# Patient Record
Sex: MALE | Race: WHITE | Employment: OTHER | URBAN - METROPOLITAN AREA
[De-identification: names, ages, dates, MRNs, and addresses within clinical notes are randomized per-mention and may not be internally consistent; named-entity substitution may affect disease eponyms.]

---

## 2017-01-24 PROCEDURE — 81003 URINALYSIS AUTO W/O SCOPE: CPT | Performed by: FAMILY MEDICINE

## 2017-02-24 PROBLEM — Z95.1 S/P CABG (CORONARY ARTERY BYPASS GRAFT): Status: ACTIVE | Noted: 2017-02-24

## 2018-01-08 PROCEDURE — 81003 URINALYSIS AUTO W/O SCOPE: CPT | Performed by: FAMILY MEDICINE

## 2018-01-18 PROBLEM — H90.6 MIXED CONDUCTIVE AND SENSORINEURAL HEARING LOSS OF BOTH EARS: Status: ACTIVE | Noted: 2018-01-18

## 2018-02-22 PROBLEM — R68.84 JAW PAIN: Status: ACTIVE | Noted: 2018-02-22

## 2018-03-26 PROBLEM — M51.26 HNP (HERNIATED NUCLEUS PULPOSUS), LUMBAR: Status: ACTIVE | Noted: 2018-03-26

## 2018-03-26 PROBLEM — M54.16 RIGHT LUMBAR RADICULITIS: Status: ACTIVE | Noted: 2018-03-26

## 2018-04-16 PROBLEM — Z98.890 HISTORY OF LUMBAR LAMINECTOMY FOR SPINAL CORD DECOMPRESSION: Status: ACTIVE | Noted: 2018-04-16

## 2018-05-01 PROBLEM — M54.41 CHRONIC RIGHT-SIDED LOW BACK PAIN WITH RIGHT-SIDED SCIATICA: Status: ACTIVE | Noted: 2018-05-01

## 2018-05-01 PROBLEM — G89.29 CHRONIC RIGHT-SIDED LOW BACK PAIN WITH RIGHT-SIDED SCIATICA: Status: ACTIVE | Noted: 2018-05-01

## 2019-02-04 PROBLEM — Z00.00 MEDICARE ANNUAL WELLNESS VISIT, SUBSEQUENT: Status: ACTIVE | Noted: 2019-02-04

## 2019-02-06 PROCEDURE — 81001 URINALYSIS AUTO W/SCOPE: CPT | Performed by: FAMILY MEDICINE

## 2019-08-08 PROCEDURE — 87086 URINE CULTURE/COLONY COUNT: CPT | Performed by: FAMILY MEDICINE

## 2019-08-08 PROCEDURE — 81003 URINALYSIS AUTO W/O SCOPE: CPT | Performed by: FAMILY MEDICINE

## 2020-01-30 PROBLEM — Z00.00 MEDICARE ANNUAL WELLNESS VISIT, SUBSEQUENT: Status: RESOLVED | Noted: 2019-02-04 | Resolved: 2020-01-30

## 2020-01-30 PROBLEM — IMO0001 MILD AORTIC SCLEROSIS: Status: ACTIVE | Noted: 2020-01-30

## 2020-02-04 PROBLEM — M25.511 CHRONIC RIGHT SHOULDER PAIN: Status: ACTIVE | Noted: 2020-02-04

## 2020-02-04 PROBLEM — G47.9 SLEEP DISTURBANCE: Status: ACTIVE | Noted: 2020-02-04

## 2020-02-04 PROBLEM — C44.519 BASAL CELL CARCINOMA (BCC) OF SKIN OF OTHER PART OF TORSO: Status: ACTIVE | Noted: 2020-02-04

## 2020-02-04 PROBLEM — G89.29 CHRONIC RIGHT SHOULDER PAIN: Status: ACTIVE | Noted: 2020-02-04

## 2021-01-18 PROBLEM — R68.84 JAW PAIN: Status: RESOLVED | Noted: 2018-02-22 | Resolved: 2021-01-18

## 2021-01-18 PROBLEM — M54.16 RIGHT LUMBAR RADICULITIS: Status: RESOLVED | Noted: 2018-03-26 | Resolved: 2021-01-18

## 2021-01-18 PROBLEM — E66.3 OVERWEIGHT (BMI 25.0-29.9): Status: ACTIVE | Noted: 2021-01-18

## 2021-01-18 PROBLEM — M51.26 HNP (HERNIATED NUCLEUS PULPOSUS), LUMBAR: Status: RESOLVED | Noted: 2018-03-26 | Resolved: 2021-01-18

## 2021-01-19 PROBLEM — R31.21 ASYMPTOMATIC MICROSCOPIC HEMATURIA: Status: ACTIVE | Noted: 2021-01-19

## 2021-02-22 PROBLEM — D49.4 BLADDER TUMOR: Status: ACTIVE | Noted: 2021-02-22

## 2021-03-05 PROCEDURE — 88307 TISSUE EXAM BY PATHOLOGIST: CPT | Performed by: UROLOGY

## 2021-03-09 PROBLEM — C67.8 MALIGNANT NEOPLASM OF OVERLAPPING SITES OF BLADDER (HCC): Status: ACTIVE | Noted: 2021-03-09

## 2021-03-09 PROBLEM — D49.4 BLADDER TUMOR: Status: RESOLVED | Noted: 2021-02-22 | Resolved: 2021-03-09

## 2021-06-04 PROCEDURE — 88108 CYTOPATH CONCENTRATE TECH: CPT | Performed by: UROLOGY

## 2021-11-02 PROBLEM — M62.551 ATROPHY OF MUSCLE OF RIGHT THIGH: Status: ACTIVE | Noted: 2021-11-02

## 2022-12-14 ENCOUNTER — HOSPITAL ENCOUNTER (OUTPATIENT)
Dept: PHYSICAL THERAPY | Facility: HOSPITAL | Age: 82
Discharge: HOME | End: 2022-12-14
Attending: ORTHOPAEDIC SURGERY
Payer: MEDICARE

## 2022-12-14 DIAGNOSIS — M54.16 LUMBAR RADICULOPATHY: ICD-10-CM

## 2023-11-17 PROCEDURE — 88342 IMHCHEM/IMCYTCHM 1ST ANTB: CPT | Performed by: PATHOLOGY

## 2023-11-17 PROCEDURE — 88341 IMHCHEM/IMCYTCHM EA ADD ANTB: CPT | Performed by: PATHOLOGY

## 2023-11-21 ENCOUNTER — LAB REQUISITION (OUTPATIENT)
Age: 83
End: 2023-11-21
Payer: MEDICARE

## 2023-11-21 DIAGNOSIS — D48.9 NEOPLASM OF UNCERTAIN BEHAVIOR: ICD-10-CM

## 2024-01-22 RX ORDER — GABAPENTIN 300 MG/1
300 CAPSULE ORAL 3 TIMES DAILY
COMMUNITY

## 2024-02-05 ENCOUNTER — ANESTHESIA EVENT (OUTPATIENT)
Dept: SURGERY | Facility: HOSPITAL | Age: 84
End: 2024-02-05
Payer: MEDICARE

## 2024-02-05 ENCOUNTER — HOSPITAL ENCOUNTER (INPATIENT)
Facility: HOSPITAL | Age: 84
LOS: 2 days | Discharge: HOME | End: 2024-02-07
Attending: ORTHOPAEDIC SURGERY | Admitting: ORTHOPAEDIC SURGERY
Payer: MEDICARE

## 2024-02-05 ENCOUNTER — APPOINTMENT (OUTPATIENT)
Dept: GENERAL RADIOLOGY | Facility: HOSPITAL | Age: 84
End: 2024-02-05
Attending: ORTHOPAEDIC SURGERY
Payer: MEDICARE

## 2024-02-05 ENCOUNTER — ANESTHESIA (OUTPATIENT)
Dept: SURGERY | Facility: HOSPITAL | Age: 84
End: 2024-02-05
Payer: MEDICARE

## 2024-02-05 PROCEDURE — 88300 SURGICAL PATH GROSS: CPT | Performed by: ORTHOPAEDIC SURGERY

## 2024-02-05 PROCEDURE — 0SN00ZZ RELEASE LUMBAR VERTEBRAL JOINT, OPEN APPROACH: ICD-10-PCS | Performed by: ORTHOPAEDIC SURGERY

## 2024-02-05 PROCEDURE — 0SG10AJ FUSION OF 2 OR MORE LUMBAR VERTEBRAL JOINTS WITH INTERBODY FUSION DEVICE, POSTERIOR APPROACH, ANTERIOR COLUMN, OPEN APPROACH: ICD-10-PCS | Performed by: ORTHOPAEDIC SURGERY

## 2024-02-05 PROCEDURE — 4A11X4G MONITORING OF PERIPHERAL NERVOUS ELECTRICAL ACTIVITY, INTRAOPERATIVE, EXTERNAL APPROACH: ICD-10-PCS | Performed by: ORTHOPAEDIC SURGERY

## 2024-02-05 PROCEDURE — 76000 FLUOROSCOPY <1 HR PHYS/QHP: CPT | Performed by: ORTHOPAEDIC SURGERY

## 2024-02-05 PROCEDURE — 0SG1071 FUSION OF 2 OR MORE LUMBAR VERTEBRAL JOINTS WITH AUTOLOGOUS TISSUE SUBSTITUTE, POSTERIOR APPROACH, POSTERIOR COLUMN, OPEN APPROACH: ICD-10-PCS | Performed by: ORTHOPAEDIC SURGERY

## 2024-02-05 DEVICE — IMPLANTABLE DEVICE: Type: IMPLANTABLE DEVICE | Site: BACK | Status: FUNCTIONAL

## 2024-02-05 DEVICE — OSTEOCEL PRO LARGE BULK BUY: Type: IMPLANTABLE DEVICE | Site: BACK | Status: FUNCTIONAL

## 2024-02-05 DEVICE — SCREW SPNL DIA5.5MM OPN TULIP LOK RELINE: Type: IMPLANTABLE DEVICE | Site: BACK | Status: FUNCTIONAL

## 2024-02-05 DEVICE — SCREW SPNL L50MM DIA6.5MM POST 16186550: Type: IMPLANTABLE DEVICE | Site: BACK | Status: FUNCTIONAL

## 2024-02-05 DEVICE — RELINE MAS RED SCREW 7.5X50 2C: Type: IMPLANTABLE DEVICE | Site: BACK | Status: FUNCTIONAL

## 2024-02-05 DEVICE — EXTENSION SPNL REDUC TULIP RELINE-O MOD MAS: Type: IMPLANTABLE DEVICE | Site: BACK | Status: FUNCTIONAL

## 2024-02-05 DEVICE — K WIRE FIX NIT BVL BLNT TIP PRECEPT: Type: IMPLANTABLE DEVICE | Site: BACK

## 2024-02-05 DEVICE — SCREW SPNL L50MM OD6.5MM 2C REDUC RELINE: Type: IMPLANTABLE DEVICE | Site: BACK | Status: FUNCTIONAL

## 2024-02-05 DEVICE — RELINE MAS TI ROD 5.5X95 LRDTC: Type: IMPLANTABLE DEVICE | Site: BACK | Status: FUNCTIONAL

## 2024-02-05 RX ORDER — DEXAMETHASONE SODIUM PHOSPHATE 4 MG/ML
VIAL (ML) INJECTION AS NEEDED
Status: DISCONTINUED | OUTPATIENT
Start: 2024-02-05 | End: 2024-02-05 | Stop reason: SURG

## 2024-02-05 RX ORDER — ONDANSETRON 2 MG/ML
4 INJECTION INTRAMUSCULAR; INTRAVENOUS EVERY 6 HOURS PRN
Status: DISCONTINUED | OUTPATIENT
Start: 2024-02-05 | End: 2024-02-05 | Stop reason: HOSPADM

## 2024-02-05 RX ORDER — GABAPENTIN 300 MG/1
300 CAPSULE ORAL 3 TIMES DAILY
Status: DISCONTINUED | OUTPATIENT
Start: 2024-02-05 | End: 2024-02-07

## 2024-02-05 RX ORDER — SODIUM CHLORIDE, SODIUM LACTATE, POTASSIUM CHLORIDE, CALCIUM CHLORIDE 600; 310; 30; 20 MG/100ML; MG/100ML; MG/100ML; MG/100ML
INJECTION, SOLUTION INTRAVENOUS CONTINUOUS
Status: DISCONTINUED | OUTPATIENT
Start: 2024-02-05 | End: 2024-02-05

## 2024-02-05 RX ORDER — TRANEXAMIC ACID 10 MG/ML
INJECTION, SOLUTION INTRAVENOUS AS NEEDED
Status: DISCONTINUED | OUTPATIENT
Start: 2024-02-05 | End: 2024-02-05 | Stop reason: SURG

## 2024-02-05 RX ORDER — ONDANSETRON 2 MG/ML
4 INJECTION INTRAMUSCULAR; INTRAVENOUS EVERY 6 HOURS PRN
Status: DISCONTINUED | OUTPATIENT
Start: 2024-02-05 | End: 2024-02-07

## 2024-02-05 RX ORDER — VANCOMYCIN HYDROCHLORIDE 1 G/20ML
INJECTION, POWDER, LYOPHILIZED, FOR SOLUTION INTRAVENOUS AS NEEDED
Status: DISCONTINUED | OUTPATIENT
Start: 2024-02-05 | End: 2024-02-05 | Stop reason: HOSPADM

## 2024-02-05 RX ORDER — HYDROCODONE BITARTRATE AND ACETAMINOPHEN 5; 325 MG/1; MG/1
1 TABLET ORAL ONCE AS NEEDED
Status: DISCONTINUED | OUTPATIENT
Start: 2024-02-05 | End: 2024-02-05 | Stop reason: HOSPADM

## 2024-02-05 RX ORDER — OXYCODONE HYDROCHLORIDE 5 MG/1
5 TABLET ORAL EVERY 4 HOURS PRN
Status: DISCONTINUED | OUTPATIENT
Start: 2024-02-05 | End: 2024-02-06

## 2024-02-05 RX ORDER — ONDANSETRON 2 MG/ML
INJECTION INTRAMUSCULAR; INTRAVENOUS AS NEEDED
Status: DISCONTINUED | OUTPATIENT
Start: 2024-02-05 | End: 2024-02-05 | Stop reason: SURG

## 2024-02-05 RX ORDER — EPHEDRINE SULFATE 50 MG/ML
INJECTION INTRAVENOUS AS NEEDED
Status: DISCONTINUED | OUTPATIENT
Start: 2024-02-05 | End: 2024-02-05 | Stop reason: SURG

## 2024-02-05 RX ORDER — BISACODYL 10 MG
10 SUPPOSITORY, RECTAL RECTAL
Status: DISCONTINUED | OUTPATIENT
Start: 2024-02-05 | End: 2024-02-07

## 2024-02-05 RX ORDER — ROCURONIUM BROMIDE 10 MG/ML
INJECTION, SOLUTION INTRAVENOUS AS NEEDED
Status: DISCONTINUED | OUTPATIENT
Start: 2024-02-05 | End: 2024-02-05 | Stop reason: SURG

## 2024-02-05 RX ORDER — HYDROCODONE BITARTRATE AND ACETAMINOPHEN 5; 325 MG/1; MG/1
2 TABLET ORAL ONCE AS NEEDED
Status: DISCONTINUED | OUTPATIENT
Start: 2024-02-05 | End: 2024-02-05 | Stop reason: HOSPADM

## 2024-02-05 RX ORDER — ATORVASTATIN CALCIUM 40 MG/1
80 TABLET, FILM COATED ORAL NIGHTLY
Status: DISCONTINUED | OUTPATIENT
Start: 2024-02-05 | End: 2024-02-07

## 2024-02-05 RX ORDER — HYDROMORPHONE HYDROCHLORIDE 1 MG/ML
0.4 INJECTION, SOLUTION INTRAMUSCULAR; INTRAVENOUS; SUBCUTANEOUS EVERY 2 HOUR PRN
Status: DISCONTINUED | OUTPATIENT
Start: 2024-02-05 | End: 2024-02-07

## 2024-02-05 RX ORDER — HYDROMORPHONE HYDROCHLORIDE 1 MG/ML
0.6 INJECTION, SOLUTION INTRAMUSCULAR; INTRAVENOUS; SUBCUTANEOUS EVERY 5 MIN PRN
Status: DISCONTINUED | OUTPATIENT
Start: 2024-02-05 | End: 2024-02-05 | Stop reason: HOSPADM

## 2024-02-05 RX ORDER — ACETAMINOPHEN 500 MG
1000 TABLET ORAL ONCE AS NEEDED
Status: DISCONTINUED | OUTPATIENT
Start: 2024-02-05 | End: 2024-02-05 | Stop reason: HOSPADM

## 2024-02-05 RX ORDER — HYDROMORPHONE HYDROCHLORIDE 1 MG/ML
0.2 INJECTION, SOLUTION INTRAMUSCULAR; INTRAVENOUS; SUBCUTANEOUS EVERY 2 HOUR PRN
Status: DISCONTINUED | OUTPATIENT
Start: 2024-02-05 | End: 2024-02-07

## 2024-02-05 RX ORDER — METOCLOPRAMIDE HYDROCHLORIDE 5 MG/ML
10 INJECTION INTRAMUSCULAR; INTRAVENOUS EVERY 8 HOURS PRN
Status: DISCONTINUED | OUTPATIENT
Start: 2024-02-05 | End: 2024-02-07

## 2024-02-05 RX ORDER — PHENYLEPHRINE HCL 10 MG/ML
VIAL (ML) INJECTION AS NEEDED
Status: DISCONTINUED | OUTPATIENT
Start: 2024-02-05 | End: 2024-02-05 | Stop reason: SURG

## 2024-02-05 RX ORDER — HYDROCODONE BITARTRATE AND ACETAMINOPHEN 10; 325 MG/1; MG/1
1 TABLET ORAL EVERY 4 HOURS PRN
COMMUNITY
Start: 2024-01-30

## 2024-02-05 RX ORDER — METHOCARBAMOL 750 MG/1
750 TABLET, FILM COATED ORAL EVERY 6 HOURS PRN
Status: DISCONTINUED | OUTPATIENT
Start: 2024-02-05 | End: 2024-02-07

## 2024-02-05 RX ORDER — DIPHENHYDRAMINE HCL 25 MG
25 CAPSULE ORAL EVERY 4 HOURS PRN
Status: DISCONTINUED | OUTPATIENT
Start: 2024-02-05 | End: 2024-02-07

## 2024-02-05 RX ORDER — CEFAZOLIN SODIUM/WATER 2 G/20 ML
2 SYRINGE (ML) INTRAVENOUS EVERY 8 HOURS
Qty: 40 ML | Refills: 0 | Status: COMPLETED | OUTPATIENT
Start: 2024-02-05 | End: 2024-02-06

## 2024-02-05 RX ORDER — CELECOXIB 200 MG/1
200 CAPSULE ORAL ONCE
Status: COMPLETED | OUTPATIENT
Start: 2024-02-05 | End: 2024-02-05

## 2024-02-05 RX ORDER — NALOXONE HYDROCHLORIDE 0.4 MG/ML
0.08 INJECTION, SOLUTION INTRAMUSCULAR; INTRAVENOUS; SUBCUTANEOUS AS NEEDED
Status: DISCONTINUED | OUTPATIENT
Start: 2024-02-05 | End: 2024-02-05 | Stop reason: HOSPADM

## 2024-02-05 RX ORDER — METOCLOPRAMIDE HYDROCHLORIDE 5 MG/ML
10 INJECTION INTRAMUSCULAR; INTRAVENOUS EVERY 8 HOURS PRN
Status: DISCONTINUED | OUTPATIENT
Start: 2024-02-05 | End: 2024-02-05 | Stop reason: HOSPADM

## 2024-02-05 RX ORDER — HYDROMORPHONE HYDROCHLORIDE 1 MG/ML
0.4 INJECTION, SOLUTION INTRAMUSCULAR; INTRAVENOUS; SUBCUTANEOUS EVERY 5 MIN PRN
Status: DISCONTINUED | OUTPATIENT
Start: 2024-02-05 | End: 2024-02-05 | Stop reason: HOSPADM

## 2024-02-05 RX ORDER — OXYCODONE HYDROCHLORIDE 5 MG/1
2.5 TABLET ORAL EVERY 4 HOURS PRN
Status: DISCONTINUED | OUTPATIENT
Start: 2024-02-05 | End: 2024-02-06

## 2024-02-05 RX ORDER — SENNOSIDES 8.6 MG
17.2 TABLET ORAL NIGHTLY
Status: DISCONTINUED | OUTPATIENT
Start: 2024-02-05 | End: 2024-02-07

## 2024-02-05 RX ORDER — METHOCARBAMOL 100 MG/ML
INJECTION, SOLUTION INTRAMUSCULAR; INTRAVENOUS
Status: COMPLETED
Start: 2024-02-05 | End: 2024-02-05

## 2024-02-05 RX ORDER — ACETAMINOPHEN 500 MG
1000 TABLET ORAL ONCE
Status: DISCONTINUED | OUTPATIENT
Start: 2024-02-05 | End: 2024-02-05 | Stop reason: HOSPADM

## 2024-02-05 RX ORDER — POLYETHYLENE GLYCOL 3350 17 G/17G
17 POWDER, FOR SOLUTION ORAL DAILY PRN
Status: DISCONTINUED | OUTPATIENT
Start: 2024-02-05 | End: 2024-02-07

## 2024-02-05 RX ORDER — DEXAMETHASONE SODIUM PHOSPHATE 4 MG/ML
10 VIAL (ML) INJECTION EVERY 8 HOURS
Qty: 15 ML | Refills: 0 | Status: COMPLETED | OUTPATIENT
Start: 2024-02-05 | End: 2024-02-06

## 2024-02-05 RX ORDER — DIPHENHYDRAMINE HYDROCHLORIDE 50 MG/ML
25 INJECTION INTRAMUSCULAR; INTRAVENOUS EVERY 4 HOURS PRN
Status: DISCONTINUED | OUTPATIENT
Start: 2024-02-05 | End: 2024-02-07

## 2024-02-05 RX ORDER — DOCUSATE SODIUM 100 MG/1
100 CAPSULE, LIQUID FILLED ORAL 2 TIMES DAILY
Status: DISCONTINUED | OUTPATIENT
Start: 2024-02-05 | End: 2024-02-07

## 2024-02-05 RX ORDER — ONDANSETRON 2 MG/ML
4 INJECTION INTRAMUSCULAR; INTRAVENOUS ONCE
Status: COMPLETED | OUTPATIENT
Start: 2024-02-05 | End: 2024-02-05

## 2024-02-05 RX ORDER — LIDOCAINE HYDROCHLORIDE 10 MG/ML
INJECTION, SOLUTION EPIDURAL; INFILTRATION; INTRACAUDAL; PERINEURAL AS NEEDED
Status: DISCONTINUED | OUTPATIENT
Start: 2024-02-05 | End: 2024-02-05 | Stop reason: SURG

## 2024-02-05 RX ORDER — SODIUM CHLORIDE 9 MG/ML
INJECTION, SOLUTION INTRAVENOUS CONTINUOUS PRN
Status: DISCONTINUED | OUTPATIENT
Start: 2024-02-05 | End: 2024-02-05 | Stop reason: SURG

## 2024-02-05 RX ORDER — METHOCARBAMOL 100 MG/ML
1 INJECTION, SOLUTION INTRAMUSCULAR; INTRAVENOUS ONCE
Status: COMPLETED | OUTPATIENT
Start: 2024-02-05 | End: 2024-02-05

## 2024-02-05 RX ORDER — HYDROMORPHONE HYDROCHLORIDE 1 MG/ML
0.2 INJECTION, SOLUTION INTRAMUSCULAR; INTRAVENOUS; SUBCUTANEOUS EVERY 5 MIN PRN
Status: DISCONTINUED | OUTPATIENT
Start: 2024-02-05 | End: 2024-02-05 | Stop reason: HOSPADM

## 2024-02-05 RX ORDER — DIAZEPAM 5 MG/1
5 TABLET ORAL EVERY 6 HOURS PRN
Status: DISCONTINUED | OUTPATIENT
Start: 2024-02-05 | End: 2024-02-07

## 2024-02-05 RX ORDER — METHOCARBAMOL 750 MG/1
1 TABLET, FILM COATED ORAL EVERY 8 HOURS PRN
COMMUNITY
Start: 2024-01-30

## 2024-02-05 RX ORDER — SODIUM CHLORIDE, SODIUM LACTATE, POTASSIUM CHLORIDE, CALCIUM CHLORIDE 600; 310; 30; 20 MG/100ML; MG/100ML; MG/100ML; MG/100ML
INJECTION, SOLUTION INTRAVENOUS CONTINUOUS
Status: DISCONTINUED | OUTPATIENT
Start: 2024-02-05 | End: 2024-02-05 | Stop reason: HOSPADM

## 2024-02-05 RX ORDER — CEFAZOLIN SODIUM/WATER 2 G/20 ML
2 SYRINGE (ML) INTRAVENOUS ONCE
Status: COMPLETED | OUTPATIENT
Start: 2024-02-05 | End: 2024-02-05

## 2024-02-05 RX ORDER — ENEMA 19; 7 G/133ML; G/133ML
1 ENEMA RECTAL ONCE AS NEEDED
Status: DISCONTINUED | OUTPATIENT
Start: 2024-02-05 | End: 2024-02-07

## 2024-02-05 RX ADMIN — ONDANSETRON 4 MG: 2 INJECTION INTRAMUSCULAR; INTRAVENOUS at 12:07:00

## 2024-02-05 RX ADMIN — PHENYLEPHRINE HCL 100 MCG: 10 MG/ML VIAL (ML) INJECTION at 08:26:00

## 2024-02-05 RX ADMIN — LIDOCAINE HYDROCHLORIDE 50 MG: 10 INJECTION, SOLUTION EPIDURAL; INFILTRATION; INTRACAUDAL; PERINEURAL at 08:11:00

## 2024-02-05 RX ADMIN — PHENYLEPHRINE HCL 100 MCG: 10 MG/ML VIAL (ML) INJECTION at 08:47:00

## 2024-02-05 RX ADMIN — EPHEDRINE SULFATE 10 MG: 50 INJECTION INTRAVENOUS at 09:49:00

## 2024-02-05 RX ADMIN — EPHEDRINE SULFATE 10 MG: 50 INJECTION INTRAVENOUS at 09:02:00

## 2024-02-05 RX ADMIN — CEFAZOLIN SODIUM/WATER 2 G: 2 G/20 ML SYRINGE (ML) INTRAVENOUS at 12:07:00

## 2024-02-05 RX ADMIN — CEFAZOLIN SODIUM/WATER 2 G: 2 G/20 ML SYRINGE (ML) INTRAVENOUS at 08:16:00

## 2024-02-05 RX ADMIN — PHENYLEPHRINE HCL 100 MCG: 10 MG/ML VIAL (ML) INJECTION at 08:44:00

## 2024-02-05 RX ADMIN — PHENYLEPHRINE HCL 100 MCG: 10 MG/ML VIAL (ML) INJECTION at 08:57:00

## 2024-02-05 RX ADMIN — ROCURONIUM BROMIDE 10 MG: 10 INJECTION, SOLUTION INTRAVENOUS at 08:11:00

## 2024-02-05 RX ADMIN — PHENYLEPHRINE HCL 100 MCG: 10 MG/ML VIAL (ML) INJECTION at 09:15:00

## 2024-02-05 RX ADMIN — PHENYLEPHRINE HCL 100 MCG: 10 MG/ML VIAL (ML) INJECTION at 08:50:00

## 2024-02-05 RX ADMIN — SODIUM CHLORIDE: 9 INJECTION, SOLUTION INTRAVENOUS at 11:41:00

## 2024-02-05 RX ADMIN — SODIUM CHLORIDE: 9 INJECTION, SOLUTION INTRAVENOUS at 08:16:00

## 2024-02-05 RX ADMIN — EPHEDRINE SULFATE 10 MG: 50 INJECTION INTRAVENOUS at 08:59:00

## 2024-02-05 RX ADMIN — PHENYLEPHRINE HCL 100 MCG: 10 MG/ML VIAL (ML) INJECTION at 08:54:00

## 2024-02-05 RX ADMIN — SODIUM CHLORIDE, SODIUM LACTATE, POTASSIUM CHLORIDE, CALCIUM CHLORIDE: 600; 310; 30; 20 INJECTION, SOLUTION INTRAVENOUS at 11:07:00

## 2024-02-05 RX ADMIN — SODIUM CHLORIDE, SODIUM LACTATE, POTASSIUM CHLORIDE, CALCIUM CHLORIDE: 600; 310; 30; 20 INJECTION, SOLUTION INTRAVENOUS at 12:57:00

## 2024-02-05 RX ADMIN — TRANEXAMIC ACID 1000 MG: 10 INJECTION, SOLUTION INTRAVENOUS at 08:45:00

## 2024-02-05 RX ADMIN — DEXAMETHASONE SODIUM PHOSPHATE 8 MG: 4 MG/ML VIAL (ML) INJECTION at 08:51:00

## 2024-02-05 NOTE — CONSULTS
[facility] Hospitalist Consult Note     Emlie Hidalgo Patient Status:  Inpatient    1940 MRN [id number]   Location [facility] 3SW-A Attending ERIC Ordonez MD   Hosp Day # 0 PCP Zack Grey MD     Consult: postoperative medical comanagement    Consulting Provider: ERIC Ordonez MD    History of Present Illness: Emile Hidalgo is a 83 year old male with CAD, HTN/HL, lumbar spinal stenosis who presents following L1-L2, L2-L3, L3-4, L4-5 revision decompression with instrumented fusion earlier today.  Procedure was complicated by incidental durotomy.  No EBL was recorded.    Following up to the floor, he remained afebrile and hemodynamically stable.  He reports minimal pain, no headache, chest pain/shortness of breath/nausea or lower extremity numbness/weakness.    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    Other and unspecified hyperlipidemia     Plantar fascial fibromatosis 2013    Resolved last addressed  18    Pure hypercholesterolemia 2015    Right lumbar radiculitis 2018    Resolved last addressed  19    Unspecified essential hypertension     Visual impairment     glasses for reading    Wears hearing aid     bilateral        Past Surgical History:   Past Surgical History:   Procedure Laterality Date    BACK SURGERY      BACK SURGERY      CABG      single vessel bypass    CABG, ARTERIAL, SINGLE  1984    CATH BARE METAL  STENT (BMS)  2002    COLON CA SCRN NOT HI RSK IND  6/30/08    Performed by YAHAIRA POTTS at [facility] [place]    CYSTOSCOPY,INSERT URETERAL STENT      OTHER SURGICAL HISTORY  02/15/2021    Cystoscopy-    OTHER SURGICAL HISTORY  03/05/2021    TURBT w/ Dr Meier    OTHER SURGICAL HISTORY  06/04/2021    BTS Cysto Dr. Padilla    OTHER SURGICAL HISTORY  12/10/2021    BTS cysto Dr. Padilla    SPINE SURGERY PROCEDURE UNLISTED  2010/2015        Social History:  reports that he quit smoking about 56 years ago. His smoking use included cigarettes. He has a 10 pack-year smoking history. He has never used smokeless tobacco. He reports that he does not currently use alcohol. He reports current drug use. Drug: Cannabis.    Family History:   Family History   Problem Relation Age of Onset    Cancer Mother     Heart Disorder Father        Allergies: No Known Allergies    Medications:    No current facility-administered medications on file prior to encounter.     Current Outpatient Medications on File Prior to Encounter   Medication Sig Dispense Refill    HYDROcodone-acetaminophen  MG Oral Tab Take 1 tablet by mouth every 4 (four) hours as needed for Pain.      methocarbamol 750 MG Oral Tab Take 1 tablet (750 mg total) by mouth every 8 (eight) hours as needed.      gabapentin 300 MG Oral Cap Take 1 capsule (300 mg total) by mouth 3 (three) times daily.      Omega-3 Fatty Acids (FISH OIL OR) Take 1 capsule by mouth daily.      PATIENT SUPPLIED MEDICATION Take by mouth once daily. CBD oil      CALCIUM OR Take 3,000 mg by mouth daily.      Ascorbic Acid (VITAMIN C OR) Take 1,000 mg by mouth daily.      VITAMIN D OR Take 1,000 mg by mouth daily.      LOSARTAN 100 MG Oral Tab TAKE 1 TABLET EVERY DAY 90 tablet 2    ATORVASTATIN 80 MG Oral Tab TAKE 1 TABLET EVERY NIGHT 90 tablet 3    aspirin 81 MG Oral Tab EC Take 1 tablet (81 mg total) by mouth daily.      naproxen 220 MG Oral Tab Take by mouth 2 (two) times a day.  Takes two tab daily in AM      Multiple Vitamins-Minerals (SENIOR MULTIVITAMIN PLUS) Oral Tab Take 1 tablet by mouth daily.         Review of Systems:   A comprehensive 14 point review of systems was completed.    Pertinent positives and negatives noted in the HPI.    Physical Exam:    /72 (BP Location: Right arm)   Pulse 90   Temp 98 °F (36.7 °C) (Oral)   Resp 16   Ht 5' 10\" (1.778 m)   Wt 191 lb (86.6 kg)   SpO2 95%   BMI 27.41 kg/m²       General: No acute distress. Comfortable, nontoxic   HEENT: Normocephalic atraumatic. Moist mucous membranes; Sclera anicteric.  Neck: Supple, no JVD  Respiratory: Clear to auscultation bilaterally. Reg resp rate & effort, no wheezes/crackles   Cardiovascular: S1, S2. Regular rate and rhythm. No murmurs appreciable   Abdomen: Soft, nontender, nondistended.  Positive bowel sounds. No rebound, guarding or organomegaly.  Neurologic: No focal neurological deficits. CNII-XII grossly intact.  Distal sensation intact to light touch  Musculoskeletal: Moves all extremities.  Extremities: No edema or cyanosis.  Wiggles toes, pedal pulses palpable  Skin/lines: Unable to assess surgical dressing due to bedrest; Houser intact draining clear yellow urine  Psychiatric: Appropriate mood and affect.      Diagnostic Data:      Labs:  No results for input(s): \"WBC\", \"HGB\", \"MCV\", \"PLT\", \"BAND\", \"INR\" in the last 168 hours.    Invalid input(s): \"LYM#\", \"MONO#\", \"BASOS#\", \"EOSIN#\"    No results for input(s): \"GLU\", \"BUN\", \"CREATSERUM\", \"GFRAA\", \"GFRNAA\", \"CA\", \"ALB\", \"NA\", \"K\", \"CL\", \"CO2\", \"ALKPHO\", \"AST\", \"ALT\", \"BILT\", \"TP\" in the last 168 hours.    CrCl cannot be calculated (Patient's most recent lab result is older than the maximum 7 days allowed.).    No results for input(s): \"PTP\", \"INR\" in the last 168 hours.    No results for input(s): \"TROP\", \"CK\" in the last 168 hours.    Imaging: Imaging data reviewed in Epic.      ASSESSMENT / PLAN:     Emile Hidalgo Is a a 83 year old  male who presents following L1-L2 decompression and uninstrumented fusion, revision L2-L5 TLIF with incidental durotomy    Problem List / Diagnoses    Lumbar spinal stenosis  S/p incidental durotomy  CAD  HTN/HL    Recommendations    Lumbar spinal stenosis  -S/p L1-L2 decompression and uninstrumented fusion, revision L2 L5 TLIF lumbar spinal stenosis  -Avoid AC/AP agents  - Pain controlled with current regimen, continue  - PT/OT pending once patient can be off bedrest    S/p incidental durotomy  -Patient currently asymptomatic  - Bedrest per spine surgery    CAD  HTN/HL  -Avoid aspirin given above  -Resume home statin  -Hold antihypertensives until POD #1, resume as BP allows    DVT Mechanical Prophylaxis: WYATT hose, SCDs,    DVT Pharmacologic Prophylaxis   Medication   None              Dispo: Stable on floor, will follow    Plan of care discussed with patient and/or family at bedside    KULWINDER Estrada MD  [facility]   [phone]

## 2024-02-05 NOTE — ANESTHESIA PREPROCEDURE EVALUATION
PRE-OP EVALUATION    Patient Name: Emile Hidalgo    Admit Diagnosis: S/P LUMBAR FUSION; LUMBAR STENOSIS    Pre-op Diagnosis: S/P LUMBAR FUSION; LUMBAR STENOSIS    LUMBAR 1 - LUMBAR 2, LUMBAR 2 - LUMBAR 3, LUMBAR 3 - LUMBAR 4, LUMBAR 4 - LUMBAR 5 REVISION DECOMPRESSION WITH INSTRUMENTED FUSION    Anesthesia Procedure: LUMBAR 1 - LUMBAR 2, LUMBAR 2 - LUMBAR 3, LUMBAR 3 - LUMBAR 4, LUMBAR 4 - LUMBAR 5 REVISION DECOMPRESSION WITH INSTRUMENTED FUSION (Spine Lumbar)  INTRAOPERATIVE NEURO MONITORING    Surgeon(s) and Role:     * ERIC Ordonez MD - Primary    Pre-op vitals reviewed.  Temp: 97.9 °F (36.6 °C)  Pulse: 69  Resp: 14  BP: 127/78  SpO2: 96 %  Body mass index is 27.41 kg/m².    Current medications reviewed.  Hospital Medications:   acetaminophen (Tylenol Extra Strength) tab 1,000 mg  1,000 mg Oral Once    lactated ringers infusion   Intravenous Continuous    [COMPLETED] ondansetron (Zofran) 4 MG/2ML injection 4 mg  4 mg Intravenous Once    [COMPLETED] celecoxib (CeleBREX) cap 200 mg  200 mg Oral Once    ceFAZolin (Ancef) 2 g in 20mL IV syringe premix  2 g Intravenous Once       Outpatient Medications:     Medications Prior to Admission   Medication Sig Dispense Refill Last Dose    HYDROcodone-acetaminophen  MG Oral Tab Take 1 tablet by mouth every 4 (four) hours as needed for Pain.   post op    methocarbamol 750 MG Oral Tab Take 1 tablet (750 mg total) by mouth every 8 (eight) hours as needed.   post op    gabapentin 300 MG Oral Cap Take 1 capsule (300 mg total) by mouth 3 (three) times daily.   2/4/2024 at 1900    Omega-3 Fatty Acids (FISH OIL OR) Take 1 capsule by mouth daily.   1/21/2024    PATIENT SUPPLIED MEDICATION Take by mouth once daily. CBD oil   1/21/2024    CALCIUM OR Take 3,000 mg by mouth daily.   2/4/2024 at 0800    Ascorbic Acid (VITAMIN C OR) Take 1,000 mg by mouth daily.   2/4/2024 at 0800    VITAMIN D OR Take 1,000 mg by mouth daily.   1/21/2024    LOSARTAN 100 MG Oral Tab TAKE 1  TABLET EVERY DAY 90 tablet 2 2/4/2024 at 1900    ATORVASTATIN 80 MG Oral Tab TAKE 1 TABLET EVERY NIGHT 90 tablet 3 2/4/2024 at 1900    aspirin 81 MG Oral Tab EC Take 1 tablet (81 mg total) by mouth daily.   1/21/2024    naproxen 220 MG Oral Tab Take by mouth 2 (two) times a day. Takes two tab daily in AM   1/21/2024    Multiple Vitamins-Minerals (SENIOR MULTIVITAMIN PLUS) Oral Tab Take 1 tablet by mouth daily.   1/21/2024       Allergies: Patient has no known allergies.      Anesthesia Evaluation    Patient summary reviewed.    Anesthetic Complications           GI/Hepatic/Renal    Negative GI/hepatic/renal ROS.                             Cardiovascular      ECG reviewed.            (+) hypertension     (+) CAD                                Endo/Other    Negative endo/other ROS.                              Pulmonary    Negative pulmonary ROS.                       Neuro/Psych                 (+) neuromuscular disease             Art line        Past Surgical History:   Procedure Laterality Date    BACK SURGERY  2010    BACK SURGERY      CABG  1983    single vessel bypass    CABG, ARTERIAL, SINGLE  1984    CATH BARE METAL STENT (BMS)  2002    COLON CA SCRN NOT HI RSK IND  6/30/08    Performed by YAHAIRA POTTS at [facility], [facility]    CYSTOSCOPY,INSERT URETERAL STENT      OTHER SURGICAL HISTORY  02/15/2021    Cystoscopy-    OTHER SURGICAL HISTORY  03/05/2021    TURBT w/ Dr Meier    OTHER SURGICAL HISTORY  06/04/2021    BTS Cysto Dr. Padilla    OTHER SURGICAL HISTORY  12/10/2021    BTS cysto Dr. Padilla    SPINE SURGERY PROCEDURE UNLISTED  2010/2015      Social History     Socioeconomic History    Marital status:     Number of children: 4   Occupational History    Occupation:      Comment: retired 2005   Tobacco Use    Smoking status: Former     Packs/day: 1.00     Years: 10.00     Additional pack years: 0.00     Total pack years: 10.00     Types: Cigarettes     Quit date:  1968     Years since quittin.1    Smokeless tobacco: Never   Vaping Use    Vaping Use: Never used   Substance and Sexual Activity    Alcohol use: Not Currently     Comment: 3 beers daily    Drug use: Yes     Types: Cannabis   Other Topics Concern     Service Yes     Comment: reserves    Blood Transfusions Yes     Comment: CABG    Caffeine Concern No    Occupational Exposure No    Hobby Hazards No    Sleep Concern No    Stress Concern No    Weight Concern No    Special Diet Yes     Comment: low fat    Back Care Yes    Exercise Yes    Seat Belt Yes     History   Drug Use    Types: Cannabis     Available pre-op labs reviewed.               Airway      Mallampati: II       Cardiovascular    Cardiovascular exam normal.         Dental    Dentition appears grossly intact         Pulmonary    Pulmonary exam normal.                 Other findings              ASA: 3   Plan: general  NPO status verified and           Plan/risks discussed with: patient                Present on Admission:  **None**

## 2024-02-05 NOTE — BRIEF OP NOTE
Pre-Operative Diagnosis: S/P LUMBAR FUSION; LUMBAR STENOSIS     Post-Operative Diagnosis: S/P LUMBAR FUSION; LUMBAR STENOSIS      Procedure Performed:   LUMBAR 1 - LUMBAR 2, LUMBAR 2 - LUMBAR 3, LUMBAR 3 - LUMBAR 4, LUMBAR 4 - LUMBAR 5 REVISION DECOMPRESSION WITH INSTRUMENTED FUSION with incidental durotomy    Surgeon(s) and Role:     * ERIC Ordonez MD - Primary    Assistant(s):  PA: Vinay Snow PA     Surgical Findings: above     Specimen: hardware for gross     Estimated Blood Loss: No data recorded    Dictation Number:      DEDE Marroquin  2/5/2024  12:45 PM

## 2024-02-05 NOTE — ANESTHESIA POSTPROCEDURE EVALUATION
[facility]    Emile Hidalgo Patient Status:  Inpatient   Age/Gender 83 year old male MRN [id number]   Location [facility] SURGERY Attending ERIC Ordonez MD   Hosp Day # 0 PCP Zack Grey MD       Anesthesia Post-op Note    LUMBAR 1 - LUMBAR 2, LUMBAR 2 - LUMBAR 3, LUMBAR 3 - LUMBAR 4, LUMBAR 4 - LUMBAR 5 REVISION DECOMPRESSION WITH INSTRUMENTED FUSION    Procedure Summary       Date: 02/05/24 Room / Location:  MAIN OR 11 / [facility] MAIN OR    Anesthesia Start: 0808 Anesthesia Stop: 1257    Procedures:       LUMBAR 1 - LUMBAR 2, LUMBAR 2 - LUMBAR 3, LUMBAR 3 - LUMBAR 4, LUMBAR 4 - LUMBAR 5 REVISION DECOMPRESSION WITH INSTRUMENTED FUSION (Spine Lumbar)      INTRAOPERATIVE NEURO MONITORING (Spine Lumbar) Diagnosis: (S/P LUMBAR FUSION; LUMBAR STENOSIS)    Surgeons: ERIC Ordonez MD Anesthesiologist: Reji Garcia MD    Anesthesia Type: general ASA Status: 3            Anesthesia Type: general    Vitals Value Taken Time   /87 02/05/24 1303   Temp 97.5 °F (36.4 °C) 02/05/24 1303   Pulse 103 02/05/24 1303   Resp 18 02/05/24 1303   SpO2 95 % 02/05/24 1303       Patient Location: PACU    Anesthesia Type: general    Airway Patency: patent    Postop Pain Control: adequate    Mental Status: preanesthetic baseline    Nausea/Vomiting: none    Cardiopulmonary/Hydration status: stable euvolemic    Complications: no apparent anesthesia related complications    Postop vital signs: stable    Dental Exam: Unchanged from Preop

## 2024-02-05 NOTE — ANESTHESIA PROCEDURE NOTES
Arterial Line    Date/Time: 2/5/2024 8:10 AM    Performed by: Hilaria May CRNA  Authorized by: Hilaria May CRNA    General Information and Staff    Procedure Start:  2/5/2024 8:10 AM  Procedure End:  2/5/2024 8:13 AM  Anesthesiologist:  Reji Garcia MD  CRNA:  Hilaria May CRNA  Performed By:  CRNA  Patient Location:  OR  Indication: continuous blood pressure monitoring and blood sampling needed    Site Identification: surface landmarks    Preanesthetic Checklist: 2 patient identifiers, IV checked, risks and benefits discussed, monitors and equipment checked, pre-op evaluation, timeout performed, anesthesia consent and sterile technique used    Procedure Details    Catheter Size:  20 G  Catheter Length:  1 and 3/4 inch  Catheter Type:  Arrow  Seldinger Technique?: Yes    Laterality:  Left  Site:  Radial artery  Site Prep: chlorhexidine    Line Secured:  Wrist Brace, tape and Tegaderm    Assessment    Events: patient tolerated procedure well with no complications      Medications  2/5/2024 8:10 AM      Additional Comments

## 2024-02-05 NOTE — PLAN OF CARE
Received pt from pacu at 1500.  Awake and alert.  Moves all extremities.  Denies n/t.  Dressing dry and intact to back.  Gel ice in place.  Houser intact and patent.  Flat bedrest maintained as per order.  Chairback brace at bedside.  Pt verbalized understanding of POC and fall precautions.

## 2024-02-05 NOTE — ANESTHESIA PROCEDURE NOTES
Airway  Date/Time: 2/5/2024 8:13 AM  Urgency: elective      General Information and Staff    Patient location during procedure: OR  Anesthesiologist: Reji Garcia MD  Resident/CRNA: Hilaria May CRNA  Performed: anesthesiologist   Performed by: Hilaria May CRNA  Authorized by: Reji Garcia MD      Indications and Patient Condition  Indications for airway management: anesthesia  Sedation level: deep  Preoxygenated: yes  Patient position: sniffing  Mask difficulty assessment: 1 - vent by mask    Final Airway Details  Final airway type: endotracheal airway      Successful airway: ETT  Cuffed: yes   Successful intubation technique: direct laryngoscopy  Endotracheal tube insertion site: oral  Blade: Padma  Blade size: #4  ETT size (mm): 7.5    Cormack-Lehane Classification: grade I - full view of glottis  Placement verified by: capnometry   Measured from: lips  ETT to lips (cm): 22  Number of attempts at approach: 1

## 2024-02-05 NOTE — PROGRESS NOTES
S: Moderate back pain with no leg pain.  No new numbness or weakness.  No headaches.  Laying flat in bed. No other complaints.     Inspection:  Awake alert No acute distress. No difficulty breathing     Blood pressure 101/69, pulse 95, temperature 97.5 °F (36.4 °C), temperature source Temporal, resp. rate 19, height 5' 10\" (1.778 m), weight 191 lb (86.6 kg), SpO2 97%.    No results for input(s): \"RBC\", \"HGB\", \"HCT\", \"MCV\", \"MCH\", \"MCHC\", \"RDW\", \"NEPRELIM\", \"WBC\", \"PLT\" in the last 168 hours.    Lumbar/Sacral Integument: Incision/ incisions;  Dressing in place, good wound approximation no erythema or fluctuance    Moving bilateral LE's.     Sensation: No sensory deficits noted on bilateral lower extremities    Calves supple, NT bilaterally    A/P: POD # 0 s/p L1-2 decompression and un instrumented fusion, and revision L2-5 TLIF with incidental durotomy    P: We will keep him flat overnight.  He is doing good.  No complaints.  F/u with him in the morning.    Vinay Snow PA-C

## 2024-02-05 NOTE — ANESTHESIA PROCEDURE NOTES
Peripheral IV  Date/Time: 2/5/2024 8:15 AM  Inserted by: Hilaria May CRNA    Placement  Needle size: 18 G  Laterality: left  Location: hand  Local anesthetic: none  Site prep: alcohol  Technique: anatomical landmarks  Attempts: 1

## 2024-02-05 NOTE — H&P
HPI:  Pre op H&P    84 y/o WM with long history of lower back pain and sciatica; recently having more pain and even numbness/weakness in the legs. No loss bowel or bladder control. Has been thru PT, medication, injections with no sustained benefit. Has had prior back surgery. Will have elective revision decompression of his previous fusion surgery.  No recent illness or fever.    Allergies:  No Known Allergies  Current Meds:  Current Outpatient Medications  Medication Sig Dispense Refill  HYDROcodone-acetaminophen (NORCO) 5-325 MG Oral Tab Take 1 tablet by mouth every 6 (six) hours as needed for Pain. 10 tablet 0  NON FORMULARY one time. CBD oil  Omega-3 Fatty Acids (FISH OIL OR) Take by mouth.  losartan 100 MG Oral Tab Take 1 tablet (100 mg total) by mouth daily. 90 tablet 3  atorvastatin 80 MG Oral Tab Take 1 tablet (80 mg total) by mouth nightly. 90 tablet 3  gabapentin 300 MG Oral Cap Take 1 capsule (300 mg total) by mouth 3 (three) times daily. 270 capsule 3  naproxen 220 MG Oral Tab Take by mouth 2 (two) times daily. (Patient not taking: Reported on 11/9/2023)  aspirin 81 MG Oral Tab EC Take 81 mg by mouth daily. (Patient not taking: Reported on 11/9/2023)  Multiple Vitamins-Minerals (SENIOR MULTIVITAMIN PLUS) Oral Tab Take by mouth.      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  Resolved last addressed 1/18/18  Pneumonia due to organism  Pure hypercholesterolemia 12/18/2015  Right lumbar radiculitis  2018  Resolved last addressed 19  Unspecified essential hypertension  Wears hearing aid  bilateral    Past Surgical History:  Procedure Laterality Date  BACK SURGERY 2010  BACK SURGERY  CABG 1983  single vessel bypass  CABG, ARTERIAL, SINGLE 1984  CATH BARE METAL STENT (BMS)   COLON CA SCRN NOT HI RSK IND 2008  Performed by YAHAIRA POTTS at [facility] [place], [facility]  CYSTOSCOPY,INSERT URETERAL STENT  OTHER SURGICAL HISTORY 02/15/2021  Cystoscopy-  OTHER SURGICAL HISTORY 2021  TURBT w/ Dr Meier  OTHER SURGICAL HISTORY 2021  BTS Cysto Dr. Padilla  OTHER SURGICAL HISTORY 12/10/2021  BTS cysto Dr. Padilla  OTHER SURGICAL HISTORY 2022  Cysto Dr. Padilla  OTHER SURGICAL HISTORY 2022  BTS cysto Dr. Padilla  OTHER SURGICAL HISTORY 2023  Cysto Dr. Padilla  OTHER SURGICAL HISTORY 2023  Cystoscopy   OTHER SURGICAL HISTORY 10/13/2023  Cystoscopy   SKIN SURGERY 2022  BCC R superior eyebrow MMS Dr. Meek  SPINE SURGERY PROCEDURE UNLISTED     Family History  Problem Relation Age of Onset  Cancer Mother  Heart Disorder Father    Family Status  Relation Status  Mo  at age 75  Son Alive  Son Alive  Son Alive  Son Alive  Fa   patient was adopted    Social History  Socioeconomic History  Marital status:   Number of children: 4  Highest education level: Master's degree (e.g., MA, MS, Penelope, MEd, MSW, MARIANA)  Occupational History  Occupation:   Comment: retired   Tobacco Use  Smoking status: Former  Packs/day: 1.00  Years: 10.00  Additional pack years: 0.00  Total pack years: 10.00  Types: Cigarettes  Quit date: 1968  Years since quittin.1  Smokeless tobacco: Never  Vaping Use  Vaping Use: Never used  Substance and Sexual Activity  Alcohol use: Yes  Alcohol/week: 20.0 standard drinks of alcohol  Types: 24 Cans of beer per week  Comment: 3 beers daily  Drug use: No  Other  Topics  Concerns:   Service: Yes  reserves  Blood Transfusions: Yes  CABG  Caffeine Concern: No  Occupational Exposure: No  Hobby Hazards: No  Sleep Concern: No  Stress Concern: No  Weight Concern: No  Special Diet: Yes  low fat  Back Care: Yes  Exercise: Yes  Seat Belt: Yes    ROS:  GENERAL HEALTH: otherwise feels well  SKIN: denies any unusual skin lesions or rashes  EYES: no visual complaints or deficits, has glasses/correction  HEENT: denies nasal congestion, sinus pain or sore throat; has hearing loss, no gum bleeding or caries, no dysphagia  RESPIRATORY: denies shortness of breath, wheezing or cough  CARDIOVASCULAR: denies chest pain or DOMINGUEZ; no palpitations  GI: denies nausea, vomiting, constipation, diarrhea; no rectal bleeding; no heartburn, no acid reflux  GENITAL/: no dysuria, urgency or frequency; no epididymal or testicular pain; no penile discharge; no hernias; occas nocturia  MUSCULOSKELETAL: has joint complaints upper or lower extremities; back as above; also R shoulder  NEURO: has sensory or motor complaint, no frequent headaches  PSYCHE: no symptoms of depression or anxiety  HEMATOLOGY: denies hx anemia; denies bruising or excessive bleeding  ENDOCRINE: denies excessive thirst or urination; denies unexpected wt gain or wt loss  ALLERGY/IMM.: denies food or seasonal allergies    PHYSICAL EXAM:  GENERAL: well developed, well nourished, in no apparent distress  SKIN: no rashes, no suspicious lesions  EYES: PERRLA, EOMI, sclera anicteric, conjunctiva normal; there is no nystagmus  HEENT: normocephalic; normal nose,  NECK: supple; FROM; no JVD, no carotid bruits, no thyroid masses or enlargement  RESPIRATORY: clear to percussion and auscultation  CARDIOVASCULAR: S1, S2 normal, RRR; no S3, no S4; no click; murmur negative, PMI not displaced  ABDOMEN: normal active BS+, soft, nondistended; no HSM; no masses; no bruits; nontender  LYMPHATIC: no lymphadenopathy  MUSCULOSKELETAL: no acute synovitis  upper or lower extremity  EXTREMITIES: no cyanosis, clubbing or edema, peripheral pulses 2 + and equal  NEUROLOGIC: intact; no sensorimotor deficit; reflexes 1+  PSYCHIATRIC: alert and oriented x 3; affect appropriate    ASSESSMENT/ PLAN:  Diagnoses and all orders for this visit:    Spinal stenosis of lumbar region with neurogenic claudication  - OFFICE/OUTPT VISIT,EST,LEVL IV  Chronic low back pain w/ sciatica and leg weakness  Will have elective spine surgery  Acceptable risk for anesthesia and elective spine surgery  Will forward pre op H&P to requesting surgeon via FAX and emr    ACC/AHA stage B heart failure (HCC)  - OFFICE/OUTPT VISIT,EST,LEVL IV  No edema; no rales; heart exam unremarkable; cont meds; monitor for exacerbation    Aorto-iliac atherosclerosis (HCC)  - OFFICE/OUTPT VISIT,EST,LEVL IV  Pulses 2+; no bruits; pt has no symptoms; continue to monitor; work on risk factor reduction    History of bladder cancer  - OFFICE/OUTPT VISIT,EST,LEVL IV  No current sx; getting ongoing surveillance; cont close urology f/u    Hypertensive heart disease with chronic right-sided congestive heart failure (HCC)  - OFFICE/OUTPT VISIT,EST,LEVL IV  BP well controlled; no signs of worsening heart failure; cont meds and monitor for exacerbation    Pure hypercholesterolemia  - OFFICE/OUTPT VISIT,EST,LEVL IV  Lipids are under adequate control. Medication is providing therapeutic benefit.  Comments: low cholesterol diet and regular exercise;  Labs: Lipid & CMP To be done in 6 months .  No change in current meds.  The above referenced H&P was reviewed by Milly Ordonez MD on 2/5/2024, and no significant changes have occurred in the patient's condition since the H&P was performed.      Risks and benefits discussed in detail.  Risks including but not limited to bleeding infection, spinal leaks, neurologic injury, paralysis and worsening of symptoms.  No guarantees made. If fusion recommended risks of pseaudarthosis, hardware  failure/migration were verbalized.

## 2024-02-06 LAB
HCT VFR BLD AUTO: 35.7 %
HGB BLD-MCNC: 11.6 G/DL

## 2024-02-06 PROCEDURE — 97165 OT EVAL LOW COMPLEX 30 MIN: CPT

## 2024-02-06 PROCEDURE — 97161 PT EVAL LOW COMPLEX 20 MIN: CPT

## 2024-02-06 PROCEDURE — 97535 SELF CARE MNGMENT TRAINING: CPT

## 2024-02-06 PROCEDURE — 85014 HEMATOCRIT: CPT | Performed by: PHYSICIAN ASSISTANT

## 2024-02-06 PROCEDURE — 85018 HEMOGLOBIN: CPT | Performed by: PHYSICIAN ASSISTANT

## 2024-02-06 RX ORDER — HYDROCODONE BITARTRATE AND ACETAMINOPHEN 10; 325 MG/1; MG/1
2 TABLET ORAL EVERY 6 HOURS PRN
Status: DISCONTINUED | OUTPATIENT
Start: 2024-02-06 | End: 2024-02-07

## 2024-02-06 RX ORDER — HYDROCODONE BITARTRATE AND ACETAMINOPHEN 10; 325 MG/1; MG/1
1 TABLET ORAL EVERY 4 HOURS PRN
Status: DISCONTINUED | OUTPATIENT
Start: 2024-02-06 | End: 2024-02-07

## 2024-02-06 NOTE — OPERATIVE REPORT
[facility]    PATIENT'S NAME: ZI LOVE   ATTENDING PHYSICIAN: ERIC Ordonez M.D.   OPERATING PHYSICIAN: ERIC Ordonez M.D.   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  PROCEDURE PERFORMED:    1.   L1-2 decompression, noninstrumented fusion.  2.   L3 to L5 revision decompression with instrumented fusion after exploration of fusion, with removal of hardware.  3.      L2-3, L3-4 interbody fusion  4. L4-5 posterior lateral fusion    INDICATIONS:  The patient had failed reasonable conservative measures which are outlined in the patient's clinic medical record.  Physical therapy, medical management, injections, alternative treatment are among those offered unless motor deficits are progressive. Indications for surgery are based on scientific literature and GIORGIO guidelines.  A thorough meeting with the patient and at least one key caregiver was had.  The risks and benefits were discussed in significant detail.  The risks include, but are not limited to, bleeding, infection, spinal leaks, nerve injuries, recurrent disc herniation, lumbar instability, and need for further surgery.  I have gone over the operation using models, diagrams, and the patient's own imaging studies. Additional written and digital sources were provided.  No guarantees were made.    ASSISTANT:  DEDE Martinez.  A skilled and experienced assistant was required for the entire surgical procedure.  As a lumbar spinal reconstruction, the procedure is done in immediate proximity to critical neural elements and is done in  close proximity to the critical vascular and visceral structures.  Much of the surgery is done in and around the cauda equine and its exiting nerves.  Any assistance, including, but not limited to, retraction, suction, and other means of facilitation of the procedure requires an individual with substantial postgraduate training and substantial spinal surgical experience.    ESTIMATED BLOOD LOSS:  150 mL.     DRAINS:  None.     DESCRIPTION OF PROCEDURE:  After obtaining informed consent, the patient was taken to the operating room.  SCDs and WYATT hose were applied.  Preoperative antibiotics were delivered.  The patient was placed prone on a Slim frame.  Neuromonitoring leads and baselines were achieved.  All bony prominences were padded.  Incision was marked and locally prepped.  A spinal needle was applied.  X-ray confirmed appropriate localization of our incision.  The needle was removed, and the back was sterilely prepped and draped.  A longitudinal incision was made with a #10 blade.  Bovie electrocautery was used for hemostasis. Dissection was taken down to the level of the fascia.  Subperiosteal dissection was taken at the level of the facet joints but keeping the facet capsules entirely intact.  Self-retaining retractors were applied.    We first began at the proximal level of our decompression and removed portions of the interspinous ligament and spinous processes.  This was done at all indicated levels.  Curettes were used to gain access to the canal.  Gonzalez ball was used to dissect ligamentum flavum away and free from the neural elements.  Then 4 and 5-mm Kerrisons were used to resect thickened ligamentum flavum and central canal stenosis of the lamina.    Attention then turned to the L1-2 level where the cranial and caudal nerve roots were both explored.  This was done by elevating a plane with the Gonzalez ball and curettes between the ligamentum flavum and the dura.  With the dura dissected free and  downward, we identified the plane and then removed this with 3, 4, and 5 mm Kerrisons.  Attention then turned to the foraminotomies, the cranial and caudal nerve roots at this level.  Contralateral decompression was undertaken thus creating a bilateral decompression and bilateral microforaminotomy and hemilaminotomy using undercutting technique.  An undercutting facet sparing hemilaminectomy was also completed. With undercutting technique and a Gonzalez ball in place, we excised ligamentum flavum through the bone at the cranial foramina as needed, facilitating a serial cranial decompression and undercutting microforaminotomy and hemilaminotomy versus undercutting hemilaminectomy.  This was done in an undercutting fashion as well and done to examine the central and contralateral neural elements facilitating central bilateral decompression as well. This was done using undercutting technique up to the level of the pedicle, with an extraforaminal area also being decompressed with the undercutting technique.  There was significant stenosis.  There was no obvious disc extrusion identified.  At the conclusion of the decompression, all areas were free of neurologic compression.      After assessing the complete decompression, we evaluated the facets.  An excess of 50% of the facets bilaterally were removed, thus increasing the likelihood of subsequent instability.  We then elected to proceed with the posterolateral fusion at the L1-2 levels.  Posterior elements were decorticated.  Local bone was applied.    Final x-ray was taken to confirm appropriate levels were addressed. The wound was thoroughly irrigated.  Hemostasis was maintained.  Floseal was applied to help with hemostasis and then was irrigated away. Valsalva maneuver confirmed that there was no spinal leak.  DuraGen was applied, subfascial drain was applied.  The fascia was closed in water-tight fashion with figure-of-eight 1-0 Vicryl; 2-0 Vicryl was used for the  subcutaneous tissues and running 3-0 subcuticular stitches were applied.  Steri-Strips were applied.  Sterile dressings were applied.     AP fluoroscopy was wheeled in.  We marked pedicles at all levels outlined above bilaterally.  Two separate incisions 1-1/2 fingerbreadths lateral to this approximately an inch and a half long were made with a larger incision on the patient's more symptomatic side.  This was done with a #10 blade. Bovie electrocautery was used for hemostasis.  Dissection was taken down to the level of the fascia.  Fascia was split in paraspinal fashion bilaterally.  We identified the hardware at L4-5.  This was sequentially removed after removing top tighteners and rods.  Through pathways were maintained with K-wires so as to reinsert hardware later.  Pseudoarthrosis was identified at that level after identifying clefts in the posterolateral fusion.  I-PAS needles were utilized with live neuromonitoring in place.  Baselines had been achieved.  We had confirmed 4 out of 4 twitches with our colleagues and neuromonitoring.  Under fluoroscopic guidance, we targeted the lateral aspect of the pedicles bilaterally.  These I-PAS needles were then impacted medially and then checked under lateral fluoroscopy for being in appropriate position.  Bone marrow was aspirated by placing a needle through the skin and through the periosteum and into the pedicle.  A 10 mL syringe was used to aspirate several milliliters of bone marrow aspirate.  The needle was removed with a twisting motion.  All I-PAS needles were found to be well above acceptable thresholds. Styluses were removed.  K-wires were applied and advanced.  Inner styluses were removed as well.  Both sides had dilators applied and had appropriate taps applied.  We placed a modular screw with hoop shims attached over the K-wire.  Both taps were applied with live neuromonitoring and found to have thresholds well above acceptable limits.  We repeated the same  steps at distal levels without difficulty.  The retractor apparatus was placed over the hoop shims.  A medial retractor was sized and applied.  All tissues were retracted out of harm's way with regards to muscle in the paraspinal plane and an articulating arm was attached to the retractor.  Bovie electrocautery was used to remove remaining tissue over the pars and facet.    Pre-operative measurements documenting a mismatch between pelvic incidence and lumbar lordosis was made, thus documenting clinically significant kyphosis. As a result, lumbar osteotomies are required to improve the patient's overall sagittal balance.    Our attention first turned to the L3-4 level.  The facet was osteotomized and resected.  A bur was used to remove additional bone.  Pars was resected as well.  We identified the lateral ligamentum flavum and resected that with a Gonzalez ball in place over the dura and a 4 and 5-mm Kerrison. Pars was resected as well.  The area was further osteotomized from cranial to caudal pedicle using bur, Kerrison rongeurs, and other instruments.  Neural elements were not harmed during this process.  The osteotomy facilitated reduction of the patient's kyphosis by allowing for compression via pedicle screws at the conclusion of the case.      Because the patient had prior surgery at this level, an extensive lysis of adhesions ventrally and dorsally was required to mobilize the nerve roots.  This required fine curets and Gonzalez used in sequential fashion with 2 and 3 mm Kerrison rongeurs.  This procedure required extensive time and expertise, lengthening the time of the operation.  This process was required to complete the necessary decompression of nerve roots at this level.    A bur was used to remove additional bone.  We identified the lateral ligamentum flavum and resected that with a Gonzalez ball in place over the dura and a 4 and 5-mm Kerrison.  Neural elements were identified and defined for a standard  posterior interbody fusion.    Significant spinal stenosis was identified consistent with the patient's radiculopathy and deficits.  We then were required to perform a technically demanding decompression using fine microcurrettes, Kerrison rongeurs, microinstruments, and magnification.  Despite adherence of the compressive pathology to neural elements, we were able to define tissue planes to help facilitate release of neurocompressive pathology.  The procedure required additional time and technique beyond that of the interbody fusion itself.   Contralateral decompression was undertaken, thus creating a bilateral decompression and bilateral microforaminotomy and hemilaminotomy using undercutting technique.  With undercutting technique and a Gonzalez ball in place, we excised ligamentum flavum through the bone at the cranial foramina as needed, facilitating a serial cranial decompression and undercutting  microforaminotomy and laminectomy.  This was done in an undercutting fashion as well and done to examine the central and contralateral neural elements facilitating central bilateral decompression as well.  The traversing nerve root was identified and thoroughly decompressed.  Undercutting decompression was undertaken.  An extraforaminal and transfacet/transpedicular decompression was utilized to perform a thorough decompression of the exiting nerve root and remove redundant annulus and any herniated nucleus pulposus.      Significant lateral compressive disease was identified consistent with the patient's radiculopathy and deficits.  We then were required to perform a technically demanding decompression using fine microcurrettes, Kerrison rongeurs, microinstruments, and magnification.  Despite adherence of the compressive pathology to neural elements, we were able to define tissue planes to help facilitate release of neurocompressive pathology.  The procedure required additional time and technique beyond that of the  interbody fusion itself.   Curettes, jaswinder, and pituitary rongeurs were used to remove the rest of the remaining disc in order to perform lateral extraforaminal and transpedicular discectomy.      Endplates were prepared.  Curettes and osteotomes were used to perform bony anterior release and osteotomize any anterior column ossification.  We applied some distraction across the pedicle screws.  We sized for a cage.  Allograft complex was impacted into the lateral disc space, and the cage was impacted as well with nerve roots safely out of harm's way.    Our attention first turned to the L2-3 level.  The facet was osteotomized and resected.  A bur was used to remove additional bone.  Pars was resected as well.  We identified the lateral ligamentum flavum and resected that with a Gonzalez ball in place over the dura and a 4 and 5-mm Kerrison. Pars was resected as well.  The area was further osteotomized from cranial to caudal pedicle using bur, Kerrison rongeurs, and other instruments.  Neural elements were not harmed    Because the patient had prior surgery at this level, an extensive lysis of adhesions ventrally and dorsally was required to mobilize the nerve roots.  This required fine curets and Gonzalez used in sequential fashion with 2 and 3 mm Kerrison rongeurs.  This procedure required extensive time and expertise, lengthening the time of the operation.  This process was required to complete the necessary decompression of nerve roots at this level.    Our attention then turned to utilization of the separate contralateral incision.  With a retractor in place, we localized the far lateral region of the level.  We examined areas more farther laterally to facilitate a transpedicular/transforaminal decompression.  Nerve root retractor was applied.  We were able to retract the traversing nerve root over.  Annulotomy was performed.  Farther lateral structures and paraspinal muscles were dissected away from the area of  the transverse process and farther anteriorly.  Psoas muscle tissues were also dissected free in the far lateral plane.      Significant lateral compressive disease was identified consistent with the patient's radiculopathy and deficits.  We then were required to perform a technically demanding decompression using fine microcurrettes, Kerrison rongeurs, microinstruments, and magnification.  Despite adherence of the compressive pathology to neural elements, we were able to define tissue planes to help facilitate release of neurocompressive pathology.  The procedure required additional time and technique beyond that of the interbody fusion itself.   Curettes, jaswinder, and pituitary rongeurs were used to remove the rest of the remaining disc in order to perform lateral extraforaminal and transpedicular discectomy.      Endplates were prepared.  Curettes and osteotomes were used to perform bony anterior release and osteotomize any anterior column ossification.  We applied some distraction across the pedicle screws.  We sized for a cage.  Allograft complex was impacted into the lateral disc space, and the cage was impacted as well with nerve roots safely out of harm's way.    Our attention then turned to the adjacent L4-5 level where we repeated the same steps.  The retractor apparatus was placed over the hoop shims.  A medial retractor was sized and applied.  All tissues were retracted out of harm's way with regards to muscle in the paraspinal plane, and an articulating arm was attached to the retractor.  Bovie electrocautery was used to remove remaining tissue over the pars and facet.  Pre-operative measurements documenting a mismatch between pelvic incidence and lumbar lordosis was made thus documenting clinically significant kyphosis.  The facet was osteotomized and resected.  A bur was used to remove additional bone.  Pars was resected as well.  We identified the lateral ligamentum flavum and resected that with a  Gonzalez ball in place over the dura and a 4 and 5 mm Kerrison.      Significant spinal stenosis was identified consistent with the patient's radiculopathy and deficits.  We then were required to perform a technically demanding decompression using fine microcurrettes, Kerrison rongeurs, microinstruments, and magnification.  Despite adherence of the compressive pathology to neural elements, we were able to define tissue planes to help facilitate release of neurocompressive pathology.  The procedure required additional time and technique beyond that of the interbody fusion itself.  The traversing nerve root was identified and thoroughly decompressed.  Contralateral decompression was undertaken, thus creating a bilateral decompression and bilateral microforaminotomy and hemilaminotomy using undercutting technique.  With undercutting technique and a Gonzalez ball in place, we excised ligamentum flavum through the bone at the cranial foramina as needed, facilitating a serial cranial decompression and undercutting microforaminotomy and hemilaminotomy versus undercutting hemilaminectomy.  This was done in an undercutting fashion and done to examine the central and contralateral neural elements facilitating central bilateral decompression as well.      Because of additional disease and neurocompressive pathology in the extraforaminal space, an extraforaminal and transpedicular decompression was utilized to perform a thorough decompression of the exiting nerve root and remove redundant annulus and any herniated nucleus pulposus.  Because the patient had prior surgery at this level, an extensive lysis of adhesions ventrally and dorsally was required to mobilize the nerve roots.  This required fine curets and Gonzalez used in sequential fashion with 2 and 3 mm Kerrison rongeurs.  This procedure required extensive time and expertise, lengthening the time of the operation.  This process was required to complete the necessary  decompression of nerve roots at this level.     Utilizing the incisions and fascial openings previously utilized for the osteotomy and decompression we proceeded with a posterolateral fusion at the L1-2 and L4-5 levels.  Posterior elements were decorticated.  Allograft and local bone graft were applied.  The wounds were thoroughly irrigated.    Hemostasis was obtained.  Tulip heads were placed on the contralateral side.  Then, with dilators in place, the screws were placed.  Instrumentation was placed on the right over K-wires in standard technique and fluoroscopy.    Screws were advanced under fluoroscopy.  All screws were tested using real-time neuromonitoring.  Thresholds were above acceptable values.  Rods and top tighteners were applied bilaterally, and final tightening was applied with compression devices to make use of the osteotomy, thereby reducing kyphosis and pelvic incidence and lordosis mismatch.  X-rays confirmed appropriate localization of our instrumentation.  Valsalva maneuver confirmed that there was no spinal leak.  The wound was thoroughly irrigated.  Hemostasis was maintained.  The fascia was reapproximated bilaterally using 1-0 Vicryl.  A 2-0 Vicryl was used for subcutaneous tissues.  A running 3-0 subcuticular stitch was applied.  Steri-Strips were applied.  Sterile dressings were applied.    The patient was extubated and taken to the recovery room in stable condition and was neurologically intact on arrival and had improvement of leg pain.  SSEPs remained at their baseline.  Needle and Ray-Fritz counts were correct at the conclusion of the case.    The patient was extubated and taken to the recovery room in stable condition and was neurologically intact at its baseline and stable on arrival.  Needle and Ray-Fritz counts were correct at the conclusion of the case.    Dictated By ERIC Ordonez M.D.  d: 02/06/2024 13:02:01  t: 02/06/2024 16:14:40  Job [id number]  PRP/

## 2024-02-06 NOTE — PROGRESS NOTES
[facility]    Progress Note     Emile Hidalgo Patient Status:  Inpatient    1940 MRN [id number]   Location [facility] 3SW-A Attending ERIC Ordonez MD   Hosp Day # 1 PCP Zack Grey MD     Follow up for: There were no encounter diagnoses.      Interval History/Subjective:     HOME overnight  Afebrile, HDS    Incisional pain controlled, denies HA, SOB, nausea or LE numbness. Awaiting Houser removal & PT.     Vital signs:  Temp:  [97.5 °F (36.4 °C)-98.4 °F (36.9 °C)] 98 °F (36.7 °C)  Pulse:  [] 85  Resp:  [16-25] 17  BP: ()/(61-87) 121/65  SpO2:  [92 %-99 %] 97 %    Physical Exam:    General: NAD, Comfortable, Nontoxic   Respiratory: CTAB; reg resp rate & effort, no wheezes/crackles  Cardiovascular: S1, S2. Regular rate and rhythm. No murmurs appreciated  Abdomen: Soft, NTND, no guarding/rebound   Neurologic: No focal neurological deficits.   Extremities: No edema.   Skin/Lines: Dry, no rashes, ulcers or lesions; Houser cathter draining clear yellow urine    Diagnostic Data:      Labs:  Recent Labs   Lab 24  0554   HGB 11.6*       No results for input(s): \"GLU\", \"BUN\", \"CREATSERUM\", \"GFRAA\", \"GFRNAA\", \"CA\", \"ALB\", \"NA\", \"K\", \"CL\", \"CO2\", \"ALKPHO\", \"AST\", \"ALT\", \"BILT\", \"TP\" in the last 168 hours.    No results for input(s): \"PTP\", \"INR\" in the last 168 hours.    No results for input(s): \"TROP\", \"CK\" in the last 168 hours.         Imaging: Imaging data reviewed in Epic.    Medications:    sennosides  17.2 mg Oral Nightly    docusate sodium  100 mg Oral BID    [COMPLETED] dexamethasone  10 mg Intravenous Q8H    atorvastatin  80 mg Oral Nightly    gabapentin  300 mg Oral TID       ASSESSMENT / PLAN:     Emile Hidalgo Is a a 83 year old male who presents following L1-L2 decompression and uninstrumented fusion, revision L2-L5 TLIF with incidental durotomy     Problem List / Diagnoses     Lumbar spinal stenosis  S/p incidental durotomy  CAD  HTN/HL     Recommendations      Lumbar spinal stenosis  -S/p L1-L2 decompression and uninstrumented fusion, revision L2 L5 TLIF lumbar spinal stenosis  -Avoid AC/AP agents  - Pain controlled with current regimen, continue  - PT/OT pending     S/p incidental durotomy  -Patient currently asymptomatic  - Bedrest/activity per spine surgery     CAD  HTN/HL  -Avoid aspirin given above  -Resume home statin  -BP currently acceptable, continue holding     DVT Mechanical Prophylaxis: WYATT hose, SCDs,    DVT Pharmacologic Prophylaxis   Medication   None                 Dispo: stable on floor; will follow; no contraindications to discharge from my perspective    Plan of care discussed with patient and/or family at bedside.    KULWINDER Estrada MD  [facility]   [phone]      This note was created using voice recognition technology. It may include inadvertent transcriptional errors. Any such errors should be contextually interpreted and should not be taken to alter the content or the meaning.     Note to Patient: The 21st Century Cures Act makes medical notes like these available to patients in the interest of transparency. However, be advised this is a medical document. It is intended as peer to peer communication. It is written in medical language and may contain abbreviations or verbiage that are unfamiliar. It may appear blunt or direct. Medical documents are intended to carry relevant information, facts as evident, and the clinical opinion of the practitioner and not intended to be the primary source of your information.  Please refer directly to myself or clinical staff for information regarding plan of care.

## 2024-02-06 NOTE — PHYSICAL THERAPY NOTE
PHYSICAL THERAPY EVALUATION - INPATIENT     Room Number: 385/385-A  Evaluation Date: 2/6/2024  Type of Evaluation: Initial  Physician Order: PT Eval and Treat    Presenting Problem: L1-L2 decompression and fusion, L2-L5 TLIF with incidental durotomy  Co-Morbidities : HTN, lumbar fusion, CABG, atrophy of R thigh muscle,  Reason for Therapy: Mobility Dysfunction and Discharge Planning  PROCEDURE 2/5/24 DR. LEO: L1-2 decompression and un instrumented fusion, and revision L2-5 TLIF with incidental durotomy    PHYSICAL THERAPY ASSESSMENT   Patient is a 83 year old male admitted 2/5/2024 for L1-L2 fusion, and L2-L5 TLIF.   Patient is currently functioning at baseline with bed mobility, transfers, gait, and stair negotiation.    Patient currently does not meet criteria for skilled inpatient physical therapy services, however patient will continue to benefit from QID ambulation with nursing staff.  Pt is hereby discharged from IP PT services.  RN aware to re-order if there is a decline in mobility status.      PLAN  Patient has been evaluated and presents with no skilled Physical Therapy needs at this time.  Patient discharged from Physical Therapy services.  Please re-order if a new functional limitation presents during this admission.    GOALS  Patient was able to achieve the following goals ...    Patient was able to transfer At previous, functional level  Safely and independently   Patient able to ambulate on level surfaces At previous, functional level  Safely and independently     HOME SITUATION  Type of Home: House   Home Layout: Multi-level  Stairs to Enter : 0     Stairs to Bedroom: 6  Railing: Yes    Lives With: Alone (supportive neighbors that plan to help)  Drives: Yes  Patient Owned Equipment: None  Patient Regularly Uses: Reading glasses;Hearing aides    Prior Level of [place]: Per pt independent with all aspects of mobility. Lives in bi-level home alone. Supportive neighbors able to assist. Does  not own any devices. Enjoys writing (Screen plays and books). Has prepared meals to last for 6 weeks and has done all the laundry.     SUBJECTIVE  \"I like to make shrimp gumbo!\"    OBJECTIVE  Precautions: Spine;Hard of hearing;Lumbar brace  Fall Risk: Standard fall risk    WEIGHT BEARING RESTRICTION  Weight Bearing Restriction: None                PAIN ASSESSMENT  Rating: Unable to rate  Location: incisional  Management Techniques: Activity promotion;Body mechanics;Repositioning    COGNITION  Overall Cognitive Status:  WFL - within functional limits    RANGE OF MOTION AND STRENGTH ASSESSMENT  Upper extremity ROM and strength are within functional limits   Lower extremity ROM is within functional limits   Lower extremity strength is within functional limits       BALANCE  Static Sitting: Good  Dynamic Sitting: Fair +  Static Standing: Fair  Dynamic Standing: Fair -    ADDITIONAL TESTS                                    ACTIVITY TOLERANCE                         O2 WALK       NEUROLOGICAL FINDINGS                        AM-PAC '6-Clicks' INPATIENT SHORT FORM - BASIC MOBILITY  How much difficulty does the patient currently have...  Patient Difficulty: Turning over in bed (including adjusting bedclothes, sheets and blankets)?: None   Patient Difficulty: Sitting down on and standing up from a chair with arms (e.g., wheelchair, bedside commode, etc.): None   Patient Difficulty: Moving from lying on back to sitting on the side of the bed?: None   How much help from another person does the patient currently need...   Help from Another: Moving to and from a bed to a chair (including a wheelchair)?: None   Help from Another: Need to walk in hospital room?: None   Help from Another: Climbing 3-5 steps with a railing?: A Little       AM-PAC Score:  Raw Score: 23   Approx Degree of Impairment: 11.2%   Standardized Score (AM-PAC Scale): 56.93   CMS Modifier (G-Code): CI    FUNCTIONAL ABILITY STATUS  Gait Assessment   Functional  Mobility/Gait Assessment  Gait Assistance: Modified independent  Distance (ft): 50, 250  Assistive Device: Rolling walker  Pattern: Within Functional Limits  Stairs: Stairs;Car transfer  How Many Stairs: 4  Device: 2 Rails  Assist: Supervision  Pattern: Ascend and Descend  Ascend and Descend : Reciprocal  Car transfer: Marimar    Skilled Therapy Provided     Bed Mobility:  Rolling: re-iterated importance of log roll technique for bed mobility  Supine to sit: NT   Sit to supine: NT     Transfer Mobility:  Sit to stand: indep   Stand to sit: indep  Gait = [place] w/ RW x 50 feet, 250 feet. Pt preferred to ambulate with RW as point of contact, but at one point picked up the walker and was attempting to carry it along.     Therapist's comments:   Patient presents sitting up in bedside chair. Discussed role and goal of physical therapy. Pt in agreement to session. Pt reporting minor pain at this time. Educated on use of log roll technique for bed mobility. Educated on spine precautions: no bending, lifting, twisting. Pt verbalizes understanding. Sit to stand transfer independently. Pt ambulated 50 feet with RW. Ascended/descended 4 steps with use of 2 rails at supervision. Car transfer Marimar. Ambulated 250 feet w RW. Pt upright in bedside chair at end of session. Discussed importance of continued out of bed functional mobility. Encouraged pt to ambulate with RN/PCT staff 4 times daily per spine surgeons recommendations. Pt verbalizes understanding.    Exercise/Education Provided:  Body mechanics  Energy conservation  Functional activity tolerated  Gait training  Posture  Transfer training    Patient End of Session: Up in chair;Needs met;Call light within reach;RN aware of session/findings;All patient questions and concerns addressed;SCDs in place;Discussed recommendations with /    Patient Evaluation Complexity Level:  History Moderate - 1 or 2 personal factors and/or co-morbidities   Examination of  body systems Low - addressing 1-2 elements   Clinical Presentation Low - Stable   Clinical Decision Making Low Complexity     PT Session Time: 15 minutes  Gait Trainin minutes

## 2024-02-06 NOTE — OCCUPATIONAL THERAPY NOTE
OCCUPATIONAL THERAPY EVALUATION - INPATIENT    Room Number: 385/385-A  Evaluation Date: 2/6/2024     Type of Evaluation: Initial  Presenting Problem: 2/5 LUMBAR 1 - LUMBAR 2, LUMBAR 2 - LUMBAR 3, LUMBAR 3 - LUMBAR 4, LUMBAR 4 - LUMBAR 5 REVISION DECOMPRESSION WITH INSTRUMENTED FUSION with incidental durotom    Physician Order: IP Consult to Occupational Therapy  Reason for Therapy:  ADL/IADL Dysfunction and Discharge Planning      OCCUPATIONAL THERAPY ASSESSMENT   Patient is a 83 year old male admitted 2/5/2024 for LUMBAR 1 - LUMBAR 2, LUMBAR 2 - LUMBAR 3, LUMBAR 3 - LUMBAR 4, LUMBAR 4 - LUMBAR 5 REVISION DECOMPRESSION WITH INSTRUMENTED FUSION with incidental durotom.  Patient is currently functioning at baseline with  all ADL .  Prior to admission, patient's baseline is independent.  Patient met all OT goals at Modified independent   level.  Patient reports no further questions/concerns at this time.       WEIGHT BEARING RESTRICTION  Weight Bearing Restriction: None                Recommendations for nursing staff:   Transfers:Modified independent    Toileting location: Toilet    EVALUATION SESSION:  Patient at start of session: seated  FUNCTIONAL TRANSFER ASSESSMENT  Sit to Stand: Chair  Chair: Modified Independent  Toilet Transfer: Modified Independent    O2 SATURATIONS       COGNITION  Overall Cognitive Status:  WFL - within functional limits  COGNITION ASSESSMENTS       Upper Extremity:   ROM: within functional limits   Strength: is within functional limits     EDUCATION PROVIDED  Patient: Role of Occupational Therapy; Plan of Care; Functional Transfer Techniques; Surgical Precautions; Posture/Positioning; Compensatory ADL Techniques; Proper Body Mechanics; Bracing Education Provided  Patient's Response to Education: Returned Demonstration; Verbalized Understanding    Equipment used: rw  Demonstrates functional use    Therapist comments: see [facility] ADL section below for details (in blue section) Patient  educated on donning and doffing brace, able to demonstrate with independence. Patient also educated on caring for brace and importance of maintaining skin integrity with good verbal understanding. Issued brace handout.         Patient End of Session: Up in chair;Needs met;Call light within reach;RN aware of session/findings;All patient questions and concerns addressed;SCDs in place    OCCUPATIONAL PROFILE    HOME SITUATION  Type of Home: House  Home Layout: Multi-level  Lives With: Alone (neighbors will help if needed)    Toilet and Equipment: Comfort height toilet (sink next to it)  Shower/Tub and Equipment: Walk-in shower       Occupation/Status: write screenplay  Hand Dominance: Right  Drives: Yes  Patient Regularly Uses: Hearing aides;Reading glasses    Prior Level of Function: independent with ADL and IADL.  Pt lives alone, but his neighbors will be helping him with household tasks, if needed. Per pt, he already bought meals and prepared for this surgery.  Back pain for 15 yrs. Pt told OT, \"My right leg lost about 25 % of muscle, because of the back pain.\" Pt writes screenplays.        PAIN ASSESSMENT  [place]  Location: incision site  Management Techniques: Activity promotion    OBJECTIVE  Precautions: Spine;Lumbar brace;Hard of hearing  Fall Risk: Standard fall risk    WEIGHT BEARING RESTRICTION  Weight Bearing Restriction: None                AM-PAC ‘6-Clicks’ Inpatient Daily Activity Short Form  -   Putting on and taking off regular lower body clothing?: None (educated the pt about LB dressing sequencing and spine precautions, independent guiding pants over legs and donning socks. Pt prefer to stay in gown)  -   Bathing (including washing, rinsing, drying)?: None  -   Toileting, which includes using toilet, bedpan or urinal? : None (independent clothing management)  -   Putting on and taking off regular upper body clothing?: None  -   Taking care of personal grooming such as brushing teeth?: None  -    Eating meals?: None    AM-PAC Score:  Score: 24  Approx Degree of Impairment: 0%  Standardized Score (AM-PAC Scale): 57.54      ADDITIONAL TESTS     NEUROLOGICAL FINDINGS        PLAN   Patient has been evaluated and presents with no skilled Occupational Therapy needs at this time.  Patient discharged from Occupational Therapy services.  Please re-order if a new functional limitation presents during this admission.      Patient Evaluation Complexity Level:   Occupational Profile/Medical History LOW - Brief history including review of medical or therapy records    Specific performance deficits impacting engagement in ADL/IADL LOW  1 - 3 performance deficits    Client Assessment/Performance Deficits MODERATE - Comorbidities and min to mod modifications of tasks    Clinical Decision Making LOW - Analysis of occupational profile, problem-focused assessments, limited treatment options    Overall Complexity LOW     OT Session Time: 20 minutes  Self-Care Home Management: 12 minutes  Therapeutic Activity: 4 minutes

## 2024-02-06 NOTE — DISCHARGE INSTRUCTIONS
Lumbar Spinal Fusion Discharge Instructions (Dr. Ordonez)    Activity  Restrictions  No twisting, pulling, pushing or lifting > 10 lbs.  No lifting anything over your head.  No bending at the waist especially if braced - you can bend at the knees but keep your back straight.    Sitting  Sit with your knees at about the same level as your hips; use a footstool if needed.  Do not sit in soft or overstuffed chairs. Firm chairs with straight backs give better support.   Recliners are OK but may need to add pillows in order to not sink into the chair.  Use a raised toilet seat if needed.  Change position every 20-30 minutes when sitting (example: after sitting, stand, then you can sit again for another 20-30 minutes).    Walking is your best exercise.  Listen to your body.  Walk several times a day  Smaller distances are better.  Your goal is to increase the distance you walk each day.  Remember to listen to your body    Sex  After 2-3 weeks, when comfortable and approved by your surgeon.  Stop if causing pain.  Check with surgeon for more information.    Driving  Usually allowed after  2-3 weeks;  check with physician at first office visit.  Do Not drive while taking narcotics or muscle relaxants.  Adhere to sitting restrictions.    Stairs  Climb stairs as needed    IF you have a Brace / Corset  Use when out of bed except when showering.  May wear even when toileting.  Anticipate wearing for 6-12 weeks after surgery; exact time to be determined by surgeon. Discuss at office visit.  Put brace on:   when sitting/standing at side of bed    Incision site care and dressing  Changes as directed by your surgeon    Always wash hands before and after dressing changes.     DRY GAUZE DRESSING  Change dressing daily using dry sterile gauze and paper tape.  Watch incision for any redness, drainage, increased warmth or opening of the incision.   Call surgeon if you notice any of these.  No lotions or ointments on or near  incision.  Steri-strips may be under dressing; these will gradually peel off  by themselves.  Any remaining steri-strips or sutures will be removed at your post op office visit                              Bathing  No tub baths or pools for 6 weeks and until cleared by surgeon.  Check with your surgeon for specific bathing/showering restrictions.  Do not shower until there is no drainage from the incision. Drainage usually stops within 3-4 days after surgery.  When allowed to shower, keep the incision as dry as possible.  Cover gauze dressing with plastic.  After showering, remove old dressing, pat incision dry and apply new dry dressing.  Do not apply any lotions or ointments to incision  Showering okay without brace                    Return to work  When cleared by surgeon. Discuss specific work activities with your surgeon.    Sleeping  Use a firm mattress.  Lay on side or back, not stomach.  May use pillow under knees, between legs or behind back if lying on your side.  Log roll to turn.    Alejandro Hose  Wear until walking.  May take off at night and for bathing. Hand wash with mild soap; line dry.    Diet and constipation prevention  Drink six to eight glasses liquid (water, juice) per day.  Eat a high fiber diet (bran, vegetables, fruit).  Use an over the counter stool softener such as Colace or senakot while taking narcotics to prevent constipation; use laxatives such as Miralax or Milk of Magnesia as needed.  An enema or suppository may be needed if above measures do not work.    No smoking  Smoking will inhibit the spine from healing with a solid fusion.  Even one cigarette a day will cause problems.  Chewing tobacco, nicotine gum or patches will also inhibit bone healing.    Pain Management  Relaxation techniques  A way to focus your attention other than on your pain. Your brain makes endorphins that are a natural body chemical that can decrease pain. Some examples of relaxation techniques are deep breathing,  listening to music or meditating.  Use Cold therapy (polar care) for 20-30 minutes multiple times per day.  Be sure there is a cloth barrier between skin and polar care.  Medication  No NSAIDS until OK with your surgeon.   NSAIDS (non-steroidal anti- inflammatory) include: Motrin, ibuprofen, Advil, Aleve, Naprosyn, Indocin, Nuprin, Vioxx, Celebrex, Bextra.(COMPLETE LIST IN GUIDEBOOK APPENDIX)  Tylenol (acetaminophen) is okay. Caution if your pain med contains Tylenol (acetaminophen); maximum 3 gms of Tylenol(acetaminophen) in 24 hours.    A single aspirin for the heart is ok if ordered by your physician.  Take muscle relaxants and pain medications as prescribed allowing 30-45 minutes to take effect.  Do NOT take alcohol while on pain medication.  Monitor need for pain medication closely  Call pharmacy or physician office at least five days before out of pain medication. If calling physician office after 3 pm, it will be handled on the next business day.    Post op office visit  Schedule 2 weeks after surgery with surgeon as directed at discharge  Schedule one week follow up with Primary Care Physician. Review all medications.    When to contact your surgeon  Temp > 101F; Take your temperature twice a day  Increased pain, swelling, redness, or any drainage to incision.  Separation of incision..  Sudden reappearance of pain that won’t go away with pain medication.  New numbness or weakness to arms or legs.  Difficulty urinating or having bowel movements  Severe headaches.    Go directly to the ER or CALL 911 if you:  become short of breath  have chest pain  cough up blood  have unexplained anxiety with breathing

## 2024-02-06 NOTE — PROGRESS NOTES
S: Moderate back pain with no leg pain.  No new numbness or weakness.  He has no complaints.  He is still laying flat.  No headaches    Inspection:  Awake alert No acute distress. No difficulty breathing     Blood pressure 121/65, pulse 85, temperature 98 °F (36.7 °C), temperature source Oral, resp. rate 17, height 5' 10\" (1.778 m), weight 191 lb (86.6 kg), SpO2 97%.    Recent Labs   Lab 02/06/24  0554   HGB 11.6*   HCT 35.7*       Lumbar/Sacral Integument: Incision/ incisions;  Dressing in place    Strength: Strength of bilateral lower extremities:     Left Right    EHL 5/5 5/5    DF 5/5 5/5    PF 4/5 5/5    Quads 4/5 5/5    IP 5/5 5/5  Sensation: No sensory deficits noted on bilateral lower extremities      Calves supple, NT bilaterally      A/P: POD # 1 s/p L1-2 decompression and un instrumented fusion, and revision L2-5 TLIF with incidental durotomy      P:  We will raise the HOB 10 degrees every 15 minutes until he is fully upright.  If no headaches still then he is OK to sit in a chair.  If comfortable in the chair, and without headaches for thirty minutes then OK to work with PT/OT.  If he develops a headache, then lay him flat until tomorrow morning.  Hopefully home tomorrow.     Vinay Snow PA-C

## 2024-02-06 NOTE — PLAN OF CARE
Patient A&ox4 . Vital signs stable . On 1L o2 via cannula post op. O2 sats in the 90's . Dressing to back clean and dry , gel ice in place . Denies any numbness or pain in the legs . Denies any headache . Houser intact draining clear yellow urine . Flat bedrest till am until seen by MD Lilly Mar/galos on . Pain managed with oral pain meds . Encouraged use of incentive spirometer and ankle pumps .  All safety precautions in place . Reminded to \"call don't fall\" . Will continue to monitor.

## 2024-02-07 VITALS
OXYGEN SATURATION: 98 % | RESPIRATION RATE: 20 BRPM | BODY MASS INDEX: 27.35 KG/M2 | HEIGHT: 70 IN | TEMPERATURE: 98 F | WEIGHT: 191 LBS | DIASTOLIC BLOOD PRESSURE: 73 MMHG | HEART RATE: 72 BPM | SYSTOLIC BLOOD PRESSURE: 122 MMHG

## 2024-02-07 RX ORDER — ASPIRIN 81 MG/1
81 TABLET ORAL DAILY
Status: SHIPPED | COMMUNITY
Start: 2024-02-10

## 2024-02-07 NOTE — PLAN OF CARE
Patient A&Ox4 . Vital signs stable . On room air o2 sats in the 90's . Pain is controlled with norco and robaxin . Dressing to back clean and dry . Refusing gel ice . Teds and scd;s on .  Gets up with standby assist with walker . Chair back brace when out of bed . Encouraged use of incentive spirometer and ankle pumps . All safety precautions in place . Reminded to \"call don't fall\" . Plan for home in am .

## 2024-02-07 NOTE — PLAN OF CARE
Pt A&O. On room air. Encouraged use of incentive spirometer and ankle pump exercises every hour while awake. Teds/scds to BLE. Tolerating diet with good appetite. Last BM 2/4/24. Miralax given this AM. Voiding w/o difficulty. Pain managed with current medications. Participating in therapy as ordered. Up independently ambulating in halls using walker and chair back brace. Pt reminded to \"call; don't fall\". Back  drsg C/D/I. Gel packs on for pain/swelling. Pt plans to be dc'd home tomorrow. Pt lives alone. Pt's ex-wife, Vanesa, to pick pt up for dc tomorrow. Pt states his neighbor will assist him with drsg changes.

## 2024-02-07 NOTE — PLAN OF CARE
Pt A&O. On room air. Encouraged use of incentive spirometer and ankle pump exercises every hour while awake. Teds/scds to BLE. Tolerating diet with good appetite. Last BM 2/4/24. Miralax given this AM. Voiding w/o difficulty. Pain managed with current medications. Up independently ambulating in halls using walker and chair back brace. Pt reminded to \"call; don't fall\". Back gauze./tegaderm drsg C/D/I. Drsg changed this AM. Extra drsgs sent home with pt. Gel packs on for pain/swelling. Pt ready for  dc home today. Pt lives alone. Pt's ex-wife, Vanesa, to pick pt up for dc. Pt states his neighbor will assist him with drsg changes. Pt has Rx at home already. Pt watched discharge instruction video. All questions answered.

## 2024-02-07 NOTE — PROGRESS NOTES
[facility] [place]    Progress Note    Emile Hidalgo Patient Status:  Inpatient    1940 MRN [id number]   Location [facility] 3SW-A Attending ERIC Ordonez MD   Hosp Day # 2 PCP Zack Grey MD       S: Patient notes some back pain but denies any radicular symptoms. He denies any headaches. He is up and walking independently. Urinating independently.     Inspection:  Awake alert No acute distress. No difficulty breathing     Blood pressure 122/73, pulse 72, temperature 97.6 °F (36.4 °C), temperature source Oral, resp. rate 20, height 5' 10\" (1.778 m), weight 191 lb (86.6 kg), SpO2 98%.    Recent Labs   Lab 24  0554   HGB 11.6*   HCT 35.7*       Lumbar/Sacral Integument: Incision/ incisions;  Dressing in place and dry    Strength: Strength of bilateral lower extremities:     Left Right    EHL 5/5 5/5    DF 5/5 5/5    PF 5/5 5/5    Quads 5/5 5/5    IP 5/5 5/5  Sensation: No sensory deficits noted on bilateral lower extremities          A/P: POD # 2 s/p L1-2 decompression and un instrumented fusion, and revision L2-5 TLIF with incidental durotomy       P: Patient is doing well and pain is well managed. Post op meds at home. Okay to discharge from spine standpoint.     Elsi Lowe PA-C   24

## 2024-02-07 NOTE — PROGRESS NOTES
[facility]    Progress Note     Emile Hidalgo Patient Status:  Inpatient    1940 MRN [id number]   [facility] 3SW-A Attending ERIC Ordonez MD   Hosp Day # 2 PCP Zack Grey MD     Follow up for: There were no encounter diagnoses.      Interval History/Subjective:     HOME overnight  Afebrile, HDS    Houser removed, voiding without issue.  Pain controlled, no chest pain/shortness of breath or lower extremity numbness.  In good spirits, hopeful to go home today    Vital signs:  Temp:  [97.6 °F (36.4 °C)-98.6 °F (37 °C)] 97.6 °F (36.4 °C)  Pulse:  [65-98] 72  Resp:  [18-20] 20  BP: (109-131)/(67-73) 122/73  SpO2:  [94 %-100 %] 98 %    Physical Exam:    General: NAD, Comfortable, Nontoxic   Respiratory: CTAB; reg resp rate & effort, no wheezes/crackles  Cardiovascular: S1, S2. Regular rate and rhythm. No murmurs appreciated  Abdomen: Soft, NTND, no guarding/rebound   Neurologic: No focal neurological deficits.   Extremities: No edema.   Skin/Lines: Dry, no rashes, ulcers or lesions    Diagnostic Data:      Labs:  Recent Labs   Lab 24  0554   HGB 11.6*       No results for input(s): \"GLU\", \"BUN\", \"CREATSERUM\", \"GFRAA\", \"GFRNAA\", \"CA\", \"ALB\", \"NA\", \"K\", \"CL\", \"CO2\", \"ALKPHO\", \"AST\", \"ALT\", \"BILT\", \"TP\" in the last 168 hours.    No results for input(s): \"PTP\", \"INR\" in the last 168 hours.    No results for input(s): \"TROP\", \"CK\" in the last 168 hours.         Imaging: Imaging data reviewed in Epic.    Medications:    sennosides  17.2 mg Oral Nightly    docusate sodium  100 mg Oral BID    atorvastatin  80 mg Oral Nightly    gabapentin  300 mg Oral TID       ASSESSMENT / PLAN:     Emile Hidalgo Is a a 83 year old male who presents following L1-L2 decompression and uninstrumented fusion, revision L2-L5 TLIF with incidental durotomy     Problem List / Diagnoses     Lumbar spinal stenosis  S/p incidental durotomy  CAD  HTN/HL     Recommendations     Lumbar spinal stenosis  -S/p  L1-L2 decompression and uninstrumented fusion, revision L2 L5 TLIF lumbar spinal stenosis  -Avoid AC/AP agents  - Pain controlled with current regimen, continue  - PT/OT pending     S/p incidental durotomy  -Patient currently asymptomatic  - Bedrest/activity per spine surgery     CAD  HTN/HL  -Avoid aspirin given above  -Resume home statin  -BP currently acceptable, continue holding for now, okay to resume at discharge    DVT Mechanical Prophylaxis: WYATT hose, SCDs,    DVT Pharmacologic Prophylaxis   Medication   None                 Dispo: stable on floor; will follow; no contraindications to discharge from my perspective    Plan of care discussed with patient and/or family at bedside.    N Ga Estrada MD  [facility]   [phone]      This note was created using voice recognition technology. It may include inadvertent transcriptional errors. Any such errors should be contextually interpreted and should not be taken to alter the content or the meaning.     Note to Patient: The 21st Century Cures Act makes medical notes like these available to patients in the interest of transparency. However, be advised this is a medical document. It is intended as peer to peer communication. It is written in medical language and may contain abbreviations or verbiage that are unfamiliar. It may appear blunt or direct. Medical documents are intended to carry relevant information, facts as evident, and the clinical opinion of the practitioner and not intended to be the primary source of your information.  Please refer directly to myself or clinical staff for information regarding plan of care.

## 2024-02-07 NOTE — CDS QUERY
CLINICAL DOCUMENTATION CLARIFICATION FORM  Dear ,   Clinical documentation (provided below) Includes a diagnosis of kyphosis .  Please further  clarify.  PLEASE “X “THE DIAGNOSIS THAT APPLIES:  Please clarify the nature of the spinal alignment:        [  ] Secondary Kyphosis                  [  ] Other-please specify:        [  ] Other kyphosis          [  ] congenital kyphosis                   [  ] Clinically unable to determine    Documentation from the Medical Record:  Risk; Back pain. 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   Op report; Description of procedure ; Pre-operative measurements documenting a mismatch between pelvic incidence and lumbar lordosis was made, thus documenting clinically significant kyphosis.  The osteotomy facilitated reduction of the patient's kyphosis by allowing for compression via pedicle screws at the conclusion of the case.  Pre-operative measurements documenting a mismatch between pelvic incidence and lumbar lordosis was made thus documenting clinically significant kyphosis.  Rods and top tighteners were applied bilaterally, and final tightening was applied with compression devices to make use of the osteotomy, thereby reducing kyphosis and pelvic incidence and lordosis mismatch.   Treatment;  L1-2 decompression, noninstrumented fusion. L3 to L5 revision decompression with instrumented fusion after exploration of fusion, with removal of hardware.    For question regarding this query, please contact Clinical Documentation : Louie Carson RN, BSN ext . [phone]            THIS FORM IS A PERMANENT PART OF THE MEDICAL RECORD

## 2024-02-08 NOTE — PAYOR COMM NOTE
--------------  DISCHARGE REVIEW    Payor: RAKESH KIDD [facility]  Subscriber #:  [id number]  Authorization Number: [id number]    Admit date: 2/5/24  Admit time:   5:49 AM  Discharge Date: 2/7/2024  1:20 PM      Discharge Summary Notes    No notes of this type exist for this encounter.         REVIEWER COMMENTS   [facility]

## 2024-02-12 NOTE — DISCHARGE SUMMARY
[facility]  Discharge Summary    Emile Hidalgo Patient Status:  Inpatient    1940 MRN [id number]   Location [facility] 3SW-A Attending No att. providers found   Hosp Day # 2 PCP Zack Grey MD     Date of Admission: 2024    Date of Discharge: 2024    Admitting Diagnosis: S/P LUMBAR FUSION; LUMBAR STENOSIS    Discharge Diagnosis:   Patient Active Problem List   Diagnosis    Personal history of other malignant neoplasm of skin    Pure hypercholesterolemia    Aorto-iliac atherosclerosis  (HCC)    Lumbar foraminal stenosis    Essential hypertension with goal blood pressure less than 140/90    S/P lumbar fusion    S/P CABG (coronary artery bypass graft)    Mixed conductive and sensorineural hearing loss of both ears    Chronic right-sided low back pain with right-sided sciatica    Medicare annual wellness visit, subsequent    Mild aortic sclerosis    Chronic right shoulder pain    Basal cell carcinoma (BCC) of skin of other part of torso    Sleep disturbance    Overweight (BMI 25.0-29.9)    Asymptomatic microscopic hematuria    Malignant neoplasm of overlapping sites of bladder (HCC)    Atrophy of muscle of right thigh       Hospital course:  The patient was admitted on above date to undergo elective surgical intervention understanding risks and benefits to surgery after failing conservative care.  Patient underwent   Surgical Procedures       Case IDs Date Procedure Surgeon Location Status    [id number] LUMBAR 1 - LUMBAR 2, LUMBAR 2 - LUMBAR 3, LUMBAR 3 - LUMBAR 4, LUMBAR 4 - LUMBAR 5 REVISION DECOMPRESSION WITH INSTRUMENTED FUSION ERIC Ordonez MD  MAIN OR Comp          Afterward, pt was brought to the PACU in stable condition.  After the recovery room the patient was transferred to the floor using standard protocol orders.  Once on the floor the patient was followed by spine service and medical services as well as appropriate ancillary consultations throughout the hospital  stay.  The patient participated in Physical and Occupational Therapy making steady progressive gains.  Once deemed stable by all services the patient was discharged on the above date.  Standard discharge instructions were given and they were asked to follow up in clinic in 2 weeks.     Disposition: Home or Self Care      Discharge Medications:   Discharge Medication List as of 2/7/2024 11:21 AM        CONTINUE these medications which have CHANGED    Details   aspirin 81 MG Oral Tab EC Take 1 tablet (81 mg total) by mouth daily., Historical           CONTINUE these medications which have NOT CHANGED    Details   HYDROcodone-acetaminophen  MG Oral Tab Take 1 tablet by mouth every 4 (four) hours as needed for Pain., Historical      methocarbamol 750 MG Oral Tab Take 1 tablet (750 mg total) by mouth every 8 (eight) hours as needed., Historical      gabapentin 300 MG Oral Cap Take 1 capsule (300 mg total) by mouth 3 (three) times daily., Historical      Omega-3 Fatty Acids (FISH OIL OR) Take 1 capsule by mouth daily., Historical      PATIENT SUPPLIED MEDICATION Take by mouth once daily. CBD oil, Historical      CALCIUM OR Take 3,000 mg by mouth daily., Historical      Ascorbic Acid (VITAMIN C OR) Take 1,000 mg by mouth daily., Historical      LOSARTAN 100 MG Oral Tab TAKE 1 TABLET EVERY DAY, Normal, Disp-90 tablet, R-2      ATORVASTATIN 80 MG Oral Tab TAKE 1 TABLET EVERY NIGHT, Normal, Disp-90 tablet, R-3      naproxen 220 MG Oral Tab Take by mouth 2 (two) times a day. Takes two tab daily in AM, Historical      Multiple Vitamins-Minerals (SENIOR MULTIVITAMIN PLUS) Oral Tab Take 1 tablet by mouth daily., Historical           STOP taking these medications       VITAMIN D OR              DEDE Marroquin  2/12/2024  9:28 AM

## 2025-06-13 RX ORDER — LOSARTAN POTASSIUM 100 MG/1
100 TABLET ORAL AT BEDTIME
COMMUNITY

## 2025-06-13 RX ORDER — NAPROXEN SODIUM 220 MG/1
440 TABLET, FILM COATED ORAL AT BEDTIME
COMMUNITY

## 2025-06-13 RX ORDER — ASPIRIN 81 MG/1
81 TABLET, CHEWABLE ORAL AT BEDTIME
COMMUNITY

## 2025-06-13 RX ORDER — ATORVASTATIN CALCIUM 80 MG/1
80 TABLET, FILM COATED ORAL AT BEDTIME
COMMUNITY

## 2025-06-13 RX ORDER — MULTIVITAMIN
1 TABLET ORAL DAILY
COMMUNITY

## 2025-06-17 ENCOUNTER — HOSPITAL ENCOUNTER (OUTPATIENT)
Age: 85
Discharge: HOME | End: 2025-06-17
Attending: INTERNAL MEDICINE | Admitting: INTERNAL MEDICINE

## 2025-06-17 VITALS
BODY MASS INDEX: 29.26 KG/M2 | HEIGHT: 69 IN | DIASTOLIC BLOOD PRESSURE: 53 MMHG | HEART RATE: 58 BPM | TEMPERATURE: 97.9 F | OXYGEN SATURATION: 96 % | SYSTOLIC BLOOD PRESSURE: 142 MMHG | WEIGHT: 197.53 LBS | RESPIRATION RATE: 16 BRPM

## 2025-06-17 DIAGNOSIS — I35.0 SEVERE AORTIC STENOSIS: ICD-10-CM

## 2025-06-17 PROCEDURE — 93010 ELECTROCARDIOGRAM REPORT: CPT | Performed by: INTERNAL MEDICINE

## 2025-06-17 PROCEDURE — 13000001 HB PHASE II RECOVERY EA 30 MINUTES: Performed by: INTERNAL MEDICINE

## 2025-06-17 PROCEDURE — C1760 CLOSURE DEV, VASC: HCPCS | Performed by: INTERNAL MEDICINE

## 2025-06-17 PROCEDURE — 93461 R&L HRT ART/VENTRICLE ANGIO: CPT | Performed by: INTERNAL MEDICINE

## 2025-06-17 PROCEDURE — C1769 GUIDE WIRE: HCPCS | Performed by: INTERNAL MEDICINE

## 2025-06-17 PROCEDURE — 99153 MOD SED SAME PHYS/QHP EA: CPT | Performed by: INTERNAL MEDICINE

## 2025-06-17 PROCEDURE — 10006027 HB SUPPLY 278: Performed by: INTERNAL MEDICINE

## 2025-06-17 PROCEDURE — 10002801 HB RX 250 W/O HCPCS: Performed by: INTERNAL MEDICINE

## 2025-06-17 PROCEDURE — C1887 CATHETER, GUIDING: HCPCS | Performed by: INTERNAL MEDICINE

## 2025-06-17 PROCEDURE — 10002805 HB CONTRAST AGENT: Performed by: INTERNAL MEDICINE

## 2025-06-17 PROCEDURE — 99152 MOD SED SAME PHYS/QHP 5/>YRS: CPT | Performed by: INTERNAL MEDICINE

## 2025-06-17 PROCEDURE — C1894 INTRO/SHEATH, NON-LASER: HCPCS | Performed by: INTERNAL MEDICINE

## 2025-06-17 PROCEDURE — 93005 ELECTROCARDIOGRAM TRACING: CPT | Performed by: INTERNAL MEDICINE

## 2025-06-17 PROCEDURE — 10006023 HB SUPPLY 272: Performed by: INTERNAL MEDICINE

## 2025-06-17 PROCEDURE — 10002800 HB RX 250 W HCPCS: Performed by: INTERNAL MEDICINE

## 2025-06-17 DEVICE — PERCLOSE™ PROSTYLE™ SUTURE-MEDIATED CLOSURE AND REPAIR SYSTEM
Type: IMPLANTABLE DEVICE | Site: FEMORAL ARTERY | Status: FUNCTIONAL
Brand: PERCLOSE™ PROSTYLE™

## 2025-06-17 RX ORDER — 0.9 % SODIUM CHLORIDE 0.9 %
10 VIAL (ML) INJECTION PRN
Status: DISCONTINUED | OUTPATIENT
Start: 2025-06-17 | End: 2025-06-17 | Stop reason: HOSPADM

## 2025-06-17 RX ORDER — 0.9 % SODIUM CHLORIDE 0.9 %
10 VIAL (ML) INJECTION PRN
Status: DISCONTINUED | OUTPATIENT
Start: 2025-06-17 | End: 2025-06-17 | Stop reason: SDUPTHER

## 2025-06-17 RX ORDER — 0.9 % SODIUM CHLORIDE 0.9 %
2 VIAL (ML) INJECTION EVERY 12 HOURS SCHEDULED
Status: DISCONTINUED | OUTPATIENT
Start: 2025-06-17 | End: 2025-06-17 | Stop reason: SDUPTHER

## 2025-06-17 RX ORDER — CLONIDINE HYDROCHLORIDE 0.1 MG/1
0.1 TABLET ORAL EVERY 4 HOURS PRN
Status: DISCONTINUED | OUTPATIENT
Start: 2025-06-17 | End: 2025-06-17 | Stop reason: HOSPADM

## 2025-06-17 RX ORDER — ACETAMINOPHEN 650 MG/1
650 SUPPOSITORY RECTAL EVERY 4 HOURS PRN
Status: DISCONTINUED | OUTPATIENT
Start: 2025-06-17 | End: 2025-06-17 | Stop reason: HOSPADM

## 2025-06-17 RX ORDER — HYDRALAZINE HYDROCHLORIDE 20 MG/ML
10 INJECTION INTRAMUSCULAR; INTRAVENOUS EVERY 4 HOURS PRN
Status: DISCONTINUED | OUTPATIENT
Start: 2025-06-17 | End: 2025-06-17 | Stop reason: HOSPADM

## 2025-06-17 RX ORDER — NITROGLYCERIN 0.4 MG/1
0.4 TABLET SUBLINGUAL EVERY 5 MIN PRN
Status: DISCONTINUED | OUTPATIENT
Start: 2025-06-17 | End: 2025-06-17 | Stop reason: HOSPADM

## 2025-06-17 RX ORDER — CLONIDINE HYDROCHLORIDE 0.1 MG/1
0.1 TABLET ORAL EVERY 4 HOURS PRN
Status: DISCONTINUED | OUTPATIENT
Start: 2025-06-17 | End: 2025-06-17 | Stop reason: ALTCHOICE

## 2025-06-17 RX ORDER — MIDAZOLAM HYDROCHLORIDE 1 MG/ML
INJECTION, SOLUTION INTRAMUSCULAR; INTRAVENOUS PRN
Status: DISCONTINUED | OUTPATIENT
Start: 2025-06-17 | End: 2025-06-17 | Stop reason: HOSPADM

## 2025-06-17 RX ORDER — ASPIRIN 325 MG
325 TABLET ORAL ONCE
Status: DISCONTINUED | OUTPATIENT
Start: 2025-06-17 | End: 2025-06-17 | Stop reason: HOSPADM

## 2025-06-17 RX ORDER — ACETAMINOPHEN 325 MG/1
650 TABLET ORAL EVERY 4 HOURS PRN
Status: DISCONTINUED | OUTPATIENT
Start: 2025-06-17 | End: 2025-06-17 | Stop reason: HOSPADM

## 2025-06-17 RX ORDER — HYDRALAZINE HYDROCHLORIDE 20 MG/ML
10 INJECTION INTRAMUSCULAR; INTRAVENOUS EVERY 4 HOURS PRN
Status: DISCONTINUED | OUTPATIENT
Start: 2025-06-17 | End: 2025-06-17 | Stop reason: SDUPTHER

## 2025-06-17 RX ORDER — 0.9 % SODIUM CHLORIDE 0.9 %
2 VIAL (ML) INJECTION EVERY 12 HOURS SCHEDULED
Status: DISCONTINUED | OUTPATIENT
Start: 2025-06-17 | End: 2025-06-17 | Stop reason: HOSPADM

## 2025-06-17 RX ORDER — LIDOCAINE HYDROCHLORIDE 20 MG/ML
INJECTION, SOLUTION EPIDURAL; INFILTRATION; INTRACAUDAL; PERINEURAL PRN
Status: DISCONTINUED | OUTPATIENT
Start: 2025-06-17 | End: 2025-06-17 | Stop reason: HOSPADM

## 2025-06-17 ASSESSMENT — PAIN SCALES - GENERAL
PAINLEVEL_OUTOF10: 0

## 2025-06-19 LAB
ATRIAL RATE (BPM): 55
P AXIS (DEGREES): 74
PR-INTERVAL (MSEC): 302
QRS-INTERVAL (MSEC): 100
QT-INTERVAL (MSEC): 456
QTC: 436
R AXIS (DEGREES): 74
REPORT TEXT: NORMAL
T AXIS (DEGREES): 87
VENTRICULAR RATE EKG/MIN (BPM): 55

## 2025-06-25 ENCOUNTER — HOSPITAL ENCOUNTER (OUTPATIENT)
Dept: ULTRASOUND IMAGING | Age: 85
Discharge: HOME | End: 2025-06-25

## 2025-06-25 ENCOUNTER — HOSPITAL ENCOUNTER (OUTPATIENT)
Dept: CT IMAGING | Age: 85
Discharge: HOME | End: 2025-06-25

## 2025-06-25 ENCOUNTER — OFFICE VISIT (OUTPATIENT)
Dept: CARDIOLOGY | Age: 85
End: 2025-06-25
Attending: INTERNAL MEDICINE

## 2025-06-25 VITALS
WEIGHT: 196.87 LBS | HEIGHT: 70 IN | HEART RATE: 64 BPM | SYSTOLIC BLOOD PRESSURE: 136 MMHG | DIASTOLIC BLOOD PRESSURE: 70 MMHG | BODY MASS INDEX: 28.18 KG/M2 | RESPIRATION RATE: 16 BRPM

## 2025-06-25 VITALS — HEART RATE: 82 BPM

## 2025-06-25 DIAGNOSIS — I35.0 SEVERE AORTIC STENOSIS: ICD-10-CM

## 2025-06-25 DIAGNOSIS — Z01.810 PRE-OPERATIVE CARDIOVASCULAR EXAMINATION: ICD-10-CM

## 2025-06-25 DIAGNOSIS — R09.89 OTHER SPECIFIED SYMPTOMS AND SIGNS INVOLVING THE CIRCULATORY AND RESPIRATORY SYSTEMS: ICD-10-CM

## 2025-06-25 DIAGNOSIS — I35.0 SEVERE AORTIC STENOSIS: Primary | ICD-10-CM

## 2025-06-25 LAB
ABO + RH BLD: NORMAL
ALBUMIN SERPL-MCNC: 3.6 G/DL (ref 3.4–5)
ALBUMIN/GLOB SERPL: 1.2 {RATIO} (ref 1–2.4)
ALP SERPL-CCNC: 66 UNITS/L (ref 45–117)
ALT SERPL-CCNC: 25 UNITS/L
ANION GAP SERPL CALC-SCNC: 5 MMOL/L (ref 7–19)
AST SERPL-CCNC: 20 UNITS/L
ATRIAL RATE (BPM): 92
BASOPHILS # BLD: 0.1 K/MCL (ref 0–0.3)
BASOPHILS NFR BLD: 1 %
BILIRUB SERPL-MCNC: 0.8 MG/DL (ref 0.2–1)
BLD GP AB SCN SERPL QL GEL: NEGATIVE
BUN SERPL-MCNC: 25 MG/DL (ref 6–20)
BUN/CREAT SERPL: 26 (ref 7–25)
CALCIUM SERPL-MCNC: 8.9 MG/DL (ref 8.4–10.2)
CHLORIDE SERPL-SCNC: 107 MMOL/L (ref 97–110)
CO2 SERPL-SCNC: 33 MMOL/L (ref 21–32)
CREAT SERPL-MCNC: 0.98 MG/DL (ref 0.67–1.17)
DEPRECATED RDW RBC: 44 FL (ref 39–50)
EGFRCR SERPLBLD CKD-EPI 2021: 77 ML/MIN/{1.73_M2}
EOSINOPHIL # BLD: 0.2 K/MCL (ref 0–0.5)
EOSINOPHIL NFR BLD: 3 %
ERYTHROCYTE [DISTWIDTH] IN BLOOD: 14.2 % (ref 11–15)
FASTING DURATION TIME PATIENT: ABNORMAL H
GLOBULIN SER-MCNC: 3.1 G/DL (ref 2–4)
GLUCOSE SERPL-MCNC: 94 MG/DL (ref 70–99)
HCT VFR BLD CALC: 42 % (ref 39–51)
HGB BLD-MCNC: 13 G/DL (ref 13–17)
IMM GRANULOCYTES # BLD AUTO: 0 K/MCL (ref 0–0.2)
IMM GRANULOCYTES # BLD: 0 %
LYMPHOCYTES # BLD: 1.5 K/MCL (ref 1–4)
LYMPHOCYTES NFR BLD: 19 %
MCH RBC QN AUTO: 26.3 PG (ref 26–34)
MCHC RBC AUTO-ENTMCNC: 31 G/DL (ref 32–36.5)
MCV RBC AUTO: 84.8 FL (ref 78–100)
MONOCYTES # BLD: 0.9 K/MCL (ref 0.3–0.9)
MONOCYTES NFR BLD: 11 %
NEUTROPHILS # BLD: 5.3 K/MCL (ref 1.8–7.7)
NEUTROPHILS NFR BLD: 66 %
NRBC BLD MANUAL-RTO: 0 /100 WBC
P AXIS (DEGREES): 64
PLATELET # BLD AUTO: 173 K/MCL (ref 140–450)
POTASSIUM SERPL-SCNC: 4.5 MMOL/L (ref 3.4–5.1)
PR-INTERVAL (MSEC): 286
PROT SERPL-MCNC: 6.7 G/DL (ref 6.4–8.2)
QRS-INTERVAL (MSEC): 106
QT-INTERVAL (MSEC): 412
QTC: 425
R AXIS (DEGREES): 43
RBC # BLD: 4.95 MIL/MCL (ref 4.5–5.9)
REPORT TEXT: NORMAL
SODIUM SERPL-SCNC: 140 MMOL/L (ref 135–145)
T AXIS (DEGREES): 76
TYPE AND SCREEN EXPIRATION DATE: NORMAL
VENTRICULAR RATE EKG/MIN (BPM): 64
WBC # BLD: 8 K/MCL (ref 4.2–11)

## 2025-06-25 PROCEDURE — 10002805 HB CONTRAST AGENT

## 2025-06-25 PROCEDURE — 86850 RBC ANTIBODY SCREEN: CPT

## 2025-06-25 PROCEDURE — 75574 CT ANGIO HRT W/3D IMAGE: CPT

## 2025-06-25 PROCEDURE — 36415 COLL VENOUS BLD VENIPUNCTURE: CPT

## 2025-06-25 PROCEDURE — 99213 OFFICE O/P EST LOW 20 MIN: CPT

## 2025-06-25 PROCEDURE — 93880 EXTRACRANIAL BILAT STUDY: CPT

## 2025-06-25 PROCEDURE — 80053 COMPREHEN METABOLIC PANEL: CPT

## 2025-06-25 PROCEDURE — 74174 CTA ABD&PLVS W/CONTRAST: CPT

## 2025-06-25 PROCEDURE — 93005 ELECTROCARDIOGRAM TRACING: CPT

## 2025-06-25 PROCEDURE — 85025 COMPLETE CBC W/AUTO DIFF WBC: CPT

## 2025-06-25 PROCEDURE — 93010 ELECTROCARDIOGRAM REPORT: CPT | Performed by: INTERNAL MEDICINE

## 2025-06-25 RX ADMIN — IOHEXOL 73 ML: 350 INJECTION, SOLUTION INTRAVENOUS at 11:02

## 2025-07-07 ENCOUNTER — ANESTHESIA EVENT (OUTPATIENT)
Dept: CARDIOLOGY | Age: 85
End: 2025-07-07

## 2025-07-07 ENCOUNTER — APPOINTMENT (OUTPATIENT)
Dept: CARDIOLOGY | Age: 85
DRG: 267 | End: 2025-07-07
Attending: INTERNAL MEDICINE

## 2025-07-07 ENCOUNTER — APPOINTMENT (OUTPATIENT)
Dept: CARDIOLOGY | Age: 85
DRG: 267 | End: 2025-07-07

## 2025-07-07 ENCOUNTER — HOSPITAL ENCOUNTER (INPATIENT)
Age: 85
LOS: 1 days | Discharge: HOME | DRG: 267 | End: 2025-07-08
Attending: INTERNAL MEDICINE | Admitting: INTERNAL MEDICINE

## 2025-07-07 ENCOUNTER — HOSPITAL ENCOUNTER (OUTPATIENT)
Dept: CARDIOLOGY | Age: 85
Discharge: HOME | End: 2025-07-07
Attending: INTERNAL MEDICINE | Admitting: INTERNAL MEDICINE

## 2025-07-07 ENCOUNTER — ANESTHESIA (OUTPATIENT)
Dept: CARDIOLOGY | Age: 85
End: 2025-07-07

## 2025-07-07 VITALS
TEMPERATURE: 97.3 F | DIASTOLIC BLOOD PRESSURE: 72 MMHG | OXYGEN SATURATION: 95 % | HEART RATE: 70 BPM | SYSTOLIC BLOOD PRESSURE: 153 MMHG

## 2025-07-07 DIAGNOSIS — I35.0 SEVERE AORTIC STENOSIS: ICD-10-CM

## 2025-07-07 LAB
ACT BLD: 278 BASELINE/TARGET RANGES ARE SET BY CLINICIANS FOR EACH PATIENT/PROCEDURE
ATRIAL RATE (BPM): 64
AV MEAN PRESS GRAD SYS DOP V1V2: 5 MM[HG]
AV MEAN VELOCITY (AVMV): 1.09
AV PEAK GRADIENT (AVPG): 8
AV STENOSIS SEVERITY TEXT: NORMAL
AV VMAX SYS DOP: 1.46
AVI LVOT PEAK GRADIENT (LVOTMG): 1
BLOOD EXPIRATION DATE: NORMAL
BLOOD EXPIRATION DATE: NORMAL
CROSSMATCH RESULT: NORMAL
CROSSMATCH RESULT: NORMAL
DISPENSE STATUS: NORMAL
DISPENSE STATUS: NORMAL
GLUCOSE BLDC GLUCOMTR-MCNC: 86 MG/DL (ref 70–99)
ISBT BLOOD TYPE: 6200
ISBT BLOOD TYPE: 6200
ISSUE DATE/TIME: NORMAL
ISSUE DATE/TIME: NORMAL
LEFT INTERNAL DIMENSION IN SYSTOLE (LVSD): 1.4
LEFT VENTRICULAR POSTERIOR WALL IN END DIASTOLE (LVPW): 4.7
LV EF 2D ECHO EST: NORMAL %
LVOT 2D (LVOTD): 21.6
LVOT VTI (LVOTVTI): 0.79
PRODUCT CODE: NORMAL
PRODUCT CODE: NORMAL
PRODUCT DESCRIPTION: NORMAL
PRODUCT DESCRIPTION: NORMAL
PRODUCT ID: NORMAL
PRODUCT ID: NORMAL
QRS-INTERVAL (MSEC): 108
QT-INTERVAL (MSEC): 442
QTC: 456
R AXIS (DEGREES): 39
REPORT TEXT: NORMAL
T AXIS (DEGREES): 80
UNIT BLOOD TYPE: NORMAL
UNIT BLOOD TYPE: NORMAL
UNIT NUMBER: NORMAL
UNIT NUMBER: NORMAL
VENTRICULAR RATE EKG/MIN (BPM): 64

## 2025-07-07 PROCEDURE — 10004451 HB PACU RECOVERY 1ST 30 MINUTES: Performed by: INTERNAL MEDICINE

## 2025-07-07 PROCEDURE — 93325 DOPPLER ECHO COLOR FLOW MAPG: CPT

## 2025-07-07 PROCEDURE — C1760 CLOSURE DEV, VASC: HCPCS | Performed by: INTERNAL MEDICINE

## 2025-07-07 PROCEDURE — 13000008 HB ANESTHESIA MAC OUTSIDE OR: Performed by: INTERNAL MEDICINE

## 2025-07-07 PROCEDURE — C1769 GUIDE WIRE: HCPCS | Performed by: INTERNAL MEDICINE

## 2025-07-07 PROCEDURE — 10002801 HB RX 250 W/O HCPCS: Performed by: STUDENT IN AN ORGANIZED HEALTH CARE EDUCATION/TRAINING PROGRAM

## 2025-07-07 PROCEDURE — 86923 COMPATIBILITY TEST ELECTRIC: CPT

## 2025-07-07 PROCEDURE — C1887 CATHETER, GUIDING: HCPCS | Performed by: INTERNAL MEDICINE

## 2025-07-07 PROCEDURE — 93005 ELECTROCARDIOGRAM TRACING: CPT

## 2025-07-07 PROCEDURE — C1894 INTRO/SHEATH, NON-LASER: HCPCS | Performed by: INTERNAL MEDICINE

## 2025-07-07 PROCEDURE — 93010 ELECTROCARDIOGRAM REPORT: CPT | Performed by: INTERNAL MEDICINE

## 2025-07-07 PROCEDURE — 10002800 HB RX 250 W HCPCS: Performed by: STUDENT IN AN ORGANIZED HEALTH CARE EDUCATION/TRAINING PROGRAM

## 2025-07-07 PROCEDURE — 10002800 HB RX 250 W HCPCS

## 2025-07-07 PROCEDURE — 10006027 HB SUPPLY 278: Performed by: INTERNAL MEDICINE

## 2025-07-07 PROCEDURE — 10004452 HB PACU ADDL 30 MINUTES: Performed by: INTERNAL MEDICINE

## 2025-07-07 PROCEDURE — 10002801 HB RX 250 W/O HCPCS

## 2025-07-07 PROCEDURE — 10002800 HB RX 250 W HCPCS: Performed by: INTERNAL MEDICINE

## 2025-07-07 PROCEDURE — 10002803 HB RX 637

## 2025-07-07 PROCEDURE — 10006023 HB SUPPLY 272: Performed by: INTERNAL MEDICINE

## 2025-07-07 PROCEDURE — 10002807 HB RX 258: Performed by: INTERNAL MEDICINE

## 2025-07-07 PROCEDURE — 02RF38Z REPLACEMENT OF AORTIC VALVE WITH ZOOPLASTIC TISSUE, PERCUTANEOUS APPROACH: ICD-10-PCS | Performed by: INTERNAL MEDICINE

## 2025-07-07 PROCEDURE — 10006031 HB ROOM CHARGE TELEMETRY

## 2025-07-07 PROCEDURE — A4216 STERILE WATER/SALINE, 10 ML: HCPCS

## 2025-07-07 PROCEDURE — 10004651 HB RX, NO CHARGE ITEM

## 2025-07-07 PROCEDURE — 10002805 HB CONTRAST AGENT: Performed by: INTERNAL MEDICINE

## 2025-07-07 PROCEDURE — 85347 COAGULATION TIME ACTIVATED: CPT | Performed by: INTERNAL MEDICINE

## 2025-07-07 PROCEDURE — 33361 REPLACE AORTIC VALVE PERQ: CPT | Performed by: INTERNAL MEDICINE

## 2025-07-07 DEVICE — PERCLOSE™ PROSTYLE™ SUTURE-MEDIATED CLOSURE AND REPAIR SYSTEM
Type: IMPLANTABLE DEVICE | Site: GROIN | Status: FUNCTIONAL
Brand: PERCLOSE™ PROSTYLE™

## 2025-07-07 DEVICE — IMPLANTABLE DEVICE: Type: IMPLANTABLE DEVICE | Site: AORTIC VALVE | Status: FUNCTIONAL

## 2025-07-07 DEVICE — SAPIEN 3 ULTRA RESILIA TRANSCATHETER HEART VALVE, 26MM
Type: IMPLANTABLE DEVICE | Site: AORTIC VALVE | Status: FUNCTIONAL
Brand: SAPIEN 3 ULTRA RESILIA

## 2025-07-07 RX ORDER — NICOTINE POLACRILEX 4 MG
30 LOZENGE BUCCAL
Status: DISCONTINUED | OUTPATIENT
Start: 2025-07-07 | End: 2025-07-07 | Stop reason: HOSPADM

## 2025-07-07 RX ORDER — ONDANSETRON 2 MG/ML
4 INJECTION INTRAMUSCULAR; INTRAVENOUS 2 TIMES DAILY PRN
Status: DISCONTINUED | OUTPATIENT
Start: 2025-07-07 | End: 2025-07-07 | Stop reason: HOSPADM

## 2025-07-07 RX ORDER — SODIUM CHLORIDE 9 MG/ML
INJECTION, SOLUTION INTRAVENOUS CONTINUOUS PRN
Status: ACTIVE | OUTPATIENT
Start: 2025-07-07 | End: 2025-07-07

## 2025-07-07 RX ORDER — PROCHLORPERAZINE MALEATE 5 MG/1
5 TABLET ORAL
Status: ACTIVE | OUTPATIENT
Start: 2025-07-07 | End: 2025-07-07

## 2025-07-07 RX ORDER — ONDANSETRON 4 MG/1
4 TABLET, ORALLY DISINTEGRATING ORAL
Status: ACTIVE | OUTPATIENT
Start: 2025-07-07 | End: 2025-07-07

## 2025-07-07 RX ORDER — OXYCODONE HYDROCHLORIDE 5 MG/1
5 TABLET ORAL
Refills: 0 | Status: DISCONTINUED | OUTPATIENT
Start: 2025-07-07 | End: 2025-07-07 | Stop reason: HOSPADM

## 2025-07-07 RX ORDER — DEXTROSE MONOHYDRATE 25 G/50ML
25 INJECTION, SOLUTION INTRAVENOUS PRN
Status: DISCONTINUED | OUTPATIENT
Start: 2025-07-07 | End: 2025-07-07 | Stop reason: HOSPADM

## 2025-07-07 RX ORDER — ATORVASTATIN CALCIUM 80 MG/1
80 TABLET, FILM COATED ORAL AT BEDTIME
Status: DISCONTINUED | OUTPATIENT
Start: 2025-07-07 | End: 2025-07-08 | Stop reason: HOSPADM

## 2025-07-07 RX ORDER — PROTAMINE SULFATE 10 MG/ML
INJECTION, SOLUTION INTRAVENOUS PRN
Status: DISCONTINUED | OUTPATIENT
Start: 2025-07-07 | End: 2025-07-07

## 2025-07-07 RX ORDER — ONDANSETRON 2 MG/ML
INJECTION INTRAMUSCULAR; INTRAVENOUS PRN
Status: DISCONTINUED | OUTPATIENT
Start: 2025-07-07 | End: 2025-07-07

## 2025-07-07 RX ORDER — HYDROCODONE BITARTRATE AND ACETAMINOPHEN 5; 325 MG/1; MG/1
1 TABLET ORAL
Refills: 0 | Status: DISCONTINUED | OUTPATIENT
Start: 2025-07-07 | End: 2025-07-07 | Stop reason: HOSPADM

## 2025-07-07 RX ORDER — PROPOFOL 10 MG/ML
INJECTION, EMULSION INTRAVENOUS PRN
Status: DISCONTINUED | OUTPATIENT
Start: 2025-07-07 | End: 2025-07-07

## 2025-07-07 RX ORDER — ENALAPRILAT 1.25 MG/ML
1.25 INJECTION INTRAVENOUS
Status: DISCONTINUED | OUTPATIENT
Start: 2025-07-07 | End: 2025-07-07 | Stop reason: HOSPADM

## 2025-07-07 RX ORDER — PROCHLORPERAZINE EDISYLATE 5 MG/ML
5 INJECTION INTRAMUSCULAR; INTRAVENOUS EVERY 4 HOURS PRN
Status: DISCONTINUED | OUTPATIENT
Start: 2025-07-07 | End: 2025-07-07 | Stop reason: HOSPADM

## 2025-07-07 RX ORDER — DROPERIDOL 2.5 MG/ML
0.62 INJECTION, SOLUTION INTRAMUSCULAR; INTRAVENOUS
Status: ACTIVE | OUTPATIENT
Start: 2025-07-07 | End: 2025-07-07

## 2025-07-07 RX ORDER — ACETAMINOPHEN 650 MG/1
650 SUPPOSITORY RECTAL EVERY 4 HOURS PRN
Status: DISCONTINUED | OUTPATIENT
Start: 2025-07-07 | End: 2025-07-07 | Stop reason: HOSPADM

## 2025-07-07 RX ORDER — HYDRALAZINE HYDROCHLORIDE 20 MG/ML
5 INJECTION INTRAMUSCULAR; INTRAVENOUS EVERY 10 MIN PRN
Status: DISCONTINUED | OUTPATIENT
Start: 2025-07-07 | End: 2025-07-07 | Stop reason: HOSPADM

## 2025-07-07 RX ORDER — LIDOCAINE HYDROCHLORIDE 20 MG/ML
INJECTION, SOLUTION EPIDURAL; INFILTRATION; INTRACAUDAL; PERINEURAL PRN
Status: DISCONTINUED | OUTPATIENT
Start: 2025-07-07 | End: 2025-07-07 | Stop reason: HOSPADM

## 2025-07-07 RX ORDER — ACETAMINOPHEN 325 MG/1
650 TABLET ORAL EVERY 4 HOURS PRN
Status: DISCONTINUED | OUTPATIENT
Start: 2025-07-07 | End: 2025-07-07 | Stop reason: HOSPADM

## 2025-07-07 RX ORDER — 0.9 % SODIUM CHLORIDE 0.9 %
10 VIAL (ML) INJECTION PRN
Status: DISCONTINUED | OUTPATIENT
Start: 2025-07-07 | End: 2025-07-07 | Stop reason: HOSPADM

## 2025-07-07 RX ORDER — LOSARTAN POTASSIUM 50 MG/1
100 TABLET ORAL AT BEDTIME
Status: DISCONTINUED | OUTPATIENT
Start: 2025-07-07 | End: 2025-07-08 | Stop reason: HOSPADM

## 2025-07-07 RX ORDER — ACETAMINOPHEN 325 MG/1
650 TABLET ORAL EVERY 6 HOURS PRN
Status: DISCONTINUED | OUTPATIENT
Start: 2025-07-07 | End: 2025-07-08 | Stop reason: HOSPADM

## 2025-07-07 RX ORDER — DROPERIDOL 2.5 MG/ML
0.62 INJECTION, SOLUTION INTRAMUSCULAR; INTRAVENOUS
Status: DISCONTINUED | OUTPATIENT
Start: 2025-07-07 | End: 2025-07-07 | Stop reason: HOSPADM

## 2025-07-07 RX ORDER — 0.9 % SODIUM CHLORIDE 0.9 %
2 VIAL (ML) INJECTION EVERY 12 HOURS SCHEDULED
Status: DISCONTINUED | OUTPATIENT
Start: 2025-07-07 | End: 2025-07-08 | Stop reason: HOSPADM

## 2025-07-07 RX ORDER — ACETAMINOPHEN 325 MG/1
650 TABLET ORAL
Status: ACTIVE | OUTPATIENT
Start: 2025-07-07 | End: 2025-07-07

## 2025-07-07 RX ORDER — IODIXANOL 320 MG/ML
INJECTION, SOLUTION INTRAVASCULAR PRN
Status: DISCONTINUED | OUTPATIENT
Start: 2025-07-07 | End: 2025-07-07 | Stop reason: HOSPADM

## 2025-07-07 RX ORDER — LIDOCAINE 4 G/G
1 PATCH TOPICAL DAILY PRN
Status: DISCONTINUED | OUTPATIENT
Start: 2025-07-07 | End: 2025-07-08 | Stop reason: HOSPADM

## 2025-07-07 RX ORDER — 0.9 % SODIUM CHLORIDE 0.9 %
10 VIAL (ML) INJECTION PRN
Status: DISCONTINUED | OUTPATIENT
Start: 2025-07-07 | End: 2025-07-08 | Stop reason: HOSPADM

## 2025-07-07 RX ORDER — ASPIRIN 81 MG/1
81 TABLET, CHEWABLE ORAL AT BEDTIME
Status: DISCONTINUED | OUTPATIENT
Start: 2025-07-07 | End: 2025-07-08 | Stop reason: HOSPADM

## 2025-07-07 RX ORDER — 0.9 % SODIUM CHLORIDE 0.9 %
2 VIAL (ML) INJECTION EVERY 12 HOURS SCHEDULED
Status: DISCONTINUED | OUTPATIENT
Start: 2025-07-07 | End: 2025-07-07 | Stop reason: HOSPADM

## 2025-07-07 RX ADMIN — PROPOFOL 100 MCG/KG/MIN: 10 INJECTION, EMULSION INTRAVENOUS at 10:30

## 2025-07-07 RX ADMIN — HYDROMORPHONE HYDROCHLORIDE 0.4 MG: 1 INJECTION, SOLUTION INTRAMUSCULAR; INTRAVENOUS; SUBCUTANEOUS at 12:11

## 2025-07-07 RX ADMIN — ASPIRIN 81 MG CHEWABLE TABLET 81 MG: 81 TABLET CHEWABLE at 20:37

## 2025-07-07 RX ADMIN — ONDANSETRON 4 MG: 2 INJECTION INTRAMUSCULAR; INTRAVENOUS at 11:22

## 2025-07-07 RX ADMIN — LABETALOL HYDROCHLORIDE 15 MG: 5 INJECTION, SOLUTION INTRAVENOUS at 11:13

## 2025-07-07 RX ADMIN — FENTANYL CITRATE 50 MCG: 50 INJECTION INTRAMUSCULAR; INTRAVENOUS at 12:04

## 2025-07-07 RX ADMIN — ATORVASTATIN CALCIUM 80 MG: 80 TABLET, FILM COATED ORAL at 20:37

## 2025-07-07 RX ADMIN — PROPOFOL 32 MG: 10 INJECTION, EMULSION INTRAVENOUS at 10:30

## 2025-07-07 RX ADMIN — FENTANYL CITRATE 50 MCG: 50 INJECTION INTRAMUSCULAR; INTRAVENOUS at 12:30

## 2025-07-07 RX ADMIN — WATER 2000 MG: 1 INJECTION INTRAMUSCULAR; INTRAVENOUS; SUBCUTANEOUS at 18:28

## 2025-07-07 RX ADMIN — LOSARTAN POTASSIUM 100 MG: 50 TABLET, FILM COATED ORAL at 20:37

## 2025-07-07 RX ADMIN — WATER 2000 MG: 1 INJECTION INTRAMUSCULAR; INTRAVENOUS; SUBCUTANEOUS at 10:32

## 2025-07-07 RX ADMIN — SODIUM CHLORIDE, PRESERVATIVE FREE 2 ML: 5 INJECTION INTRAVENOUS at 20:39

## 2025-07-07 RX ADMIN — PROTAMINE SULFATE 50 MG: 10 INJECTION, SOLUTION INTRAVENOUS at 11:20

## 2025-07-07 RX ADMIN — SODIUM CHLORIDE: 9 INJECTION, SOLUTION INTRAVENOUS at 10:23

## 2025-07-07 RX ADMIN — Medication 50 MCG/MIN: at 10:42

## 2025-07-07 RX ADMIN — HEPARIN SODIUM 10000 UNITS: 1000 INJECTION, SOLUTION INTRAVENOUS; SUBCUTANEOUS at 11:02

## 2025-07-07 SDOH — ECONOMIC STABILITY: FOOD INSECURITY: WITHIN THE PAST 12 MONTHS, THE FOOD YOU BOUGHT JUST DIDN'T LAST AND YOU DIDN'T HAVE MONEY TO GET MORE.: NEVER TRUE

## 2025-07-07 SDOH — HEALTH STABILITY: PHYSICAL HEALTH: DO YOU HAVE DIFFICULTY DRESSING OR BATHING?: NO

## 2025-07-07 SDOH — ECONOMIC STABILITY: INCOME INSECURITY: IN THE PAST 12 MONTHS, HAS THE ELECTRIC, GAS, OIL, OR WATER COMPANY THREATENED TO SHUT OFF SERVICE IN YOUR HOME?: NO

## 2025-07-07 SDOH — ECONOMIC STABILITY: HOUSING INSECURITY: WHAT IS YOUR LIVING SITUATION TODAY?: I HAVE A STEADY PLACE TO LIVE

## 2025-07-07 SDOH — HEALTH STABILITY: GENERAL
BECAUSE OF A PHYSICAL, MENTAL, OR EMOTIONAL CONDITION, DO YOU HAVE SERIOUS DIFFICULTY CONCENTRATING, REMEMBERING OR MAKING DECISIONS?: NO

## 2025-07-07 SDOH — HEALTH STABILITY: PHYSICAL HEALTH: DO YOU HAVE SERIOUS DIFFICULTY WALKING OR CLIMBING STAIRS?: NO

## 2025-07-07 SDOH — ECONOMIC STABILITY: HOUSING INSECURITY: DO YOU HAVE PROBLEMS WITH ANY OF THE FOLLOWING?: NONE OF THE ABOVE

## 2025-07-07 SDOH — SOCIAL STABILITY: SOCIAL INSECURITY: HOW OFTEN DOES ANYONE, INCLUDING FAMILY AND FRIENDS, INSULT OR TALK DOWN TO YOU?: NEVER

## 2025-07-07 SDOH — SOCIAL STABILITY: SOCIAL INSECURITY: HOW OFTEN DOES ANYONE, INCLUDING FAMILY AND FRIENDS, SCREAM OR CURSE AT YOU?: NEVER

## 2025-07-07 SDOH — ECONOMIC STABILITY: TRANSPORTATION INSECURITY
IN THE PAST 12 MONTHS, HAS LACK OF RELIABLE TRANSPORTATION KEPT YOU FROM MEDICAL APPOINTMENTS, MEETINGS, WORK OR FROM GETTING THINGS NEEDED FOR DAILY LIVING?: NO

## 2025-07-07 SDOH — SOCIAL STABILITY: SOCIAL INSECURITY: HOW OFTEN DOES ANYONE, INCLUDING FAMILY AND FRIENDS, PHYSICALLY HURT YOU?: NEVER

## 2025-07-07 SDOH — SOCIAL STABILITY: SOCIAL INSECURITY: HOW OFTEN DOES ANYONE, INCLUDING FAMILY AND FRIENDS, THREATEN YOU WITH HARM?: NEVER

## 2025-07-07 SDOH — ECONOMIC STABILITY: GENERAL

## 2025-07-07 SDOH — SOCIAL STABILITY: SOCIAL NETWORK
HOW OFTEN DO YOU SEE OR TALK TO PEOPLE THAT YOU CARE ABOUT AND FEEL CLOSE TO? (FOR EXAMPLE: TALKING TO FRIENDS ON THE PHONE, VISITING FRIENDS OR FAMILY, GOING TO CHURCH OR CLUB MEETINGS): 5 OR MORE TIMES A WEEK

## 2025-07-07 SDOH — ECONOMIC STABILITY: HOUSING INSECURITY: WHAT IS YOUR LIVING SITUATION TODAY?: ALONE

## 2025-07-07 SDOH — SOCIAL STABILITY: SOCIAL NETWORK: SUPPORT SYSTEMS: CHURCH/FAITH COMMUNITY;CHILDREN;FAMILY MEMBERS

## 2025-07-07 SDOH — HEALTH STABILITY: GENERAL: BECAUSE OF A PHYSICAL, MENTAL, OR EMOTIONAL CONDITION, DO YOU HAVE DIFFICULTY DOING ERRANDS ALONE?: NO

## 2025-07-07 SDOH — SOCIAL STABILITY: SOCIAL INSECURITY: RISK FACTORS: AGE

## 2025-07-07 SDOH — SOCIAL STABILITY: SOCIAL INSECURITY: RISK FACTORS: HEART DISEASE

## 2025-07-07 SDOH — ECONOMIC STABILITY: HOUSING INSECURITY: WHAT IS YOUR LIVING SITUATION TODAY?: HOUSE

## 2025-07-07 ASSESSMENT — ORIENTATION MEMORY CONCENTRATION TEST (OMCT)
OMCT INTERPRETATION: 0-6: NO SIGNIFICANT IMPAIRMENT
REPEAT THE NAME AND ADDRESS I ASKED YOU TO REMEMBER: CORRECT
OMCT SCORE: 0
COUNT BACKWARDS FROM 20 TO 1: CORRECT
WHAT YEAR IS IT NOW (MUST BE EXACT): CORRECT
SAY THE MONTHS IN REVERSE ORDER STARTING WITH LAST MONTH: CORRECT
WHAT MONTH IS IT NOW: CORRECT
WHAT TIME IS IT (NO WATCH OR CLOCK): CORRECT

## 2025-07-07 ASSESSMENT — LIFESTYLE VARIABLES
HOW MANY STANDARD DRINKS CONTAINING ALCOHOL DO YOU HAVE ON A TYPICAL DAY: 3 OR 4
ALCOHOL_USE_STATUS: UNHEALTHY DRINKING IDENTIFIED. AUDIT C: 3 OR MORE FOR WOMEN AND 4 OR MORE FOR MEN.
HOW OFTEN DO YOU HAVE 6 OR MORE DRINKS ON ONE OCCASION: LESS THAN MONTHLY
HOW OFTEN DO YOU HAVE A DRINK CONTAINING ALCOHOL: 4 OR MORE TIMES PER WEEK
AUDIT-C TOTAL SCORE: 6

## 2025-07-07 ASSESSMENT — PAIN SCALES - GENERAL
PAINLEVEL_OUTOF10: 0
PAINLEVEL_OUTOF10: 8
PAINLEVEL_OUTOF10: 3
PAINLEVEL_OUTOF10: 3
PAINLEVEL_OUTOF10: 6
PAINLEVEL_OUTOF10: 7
PAINLEVEL_OUTOF10: 3
PAINLEVEL_OUTOF10: 0
PAINLEVEL_OUTOF10: 0
PAINLEVEL_OUTOF10: 3
PAINLEVEL_OUTOF10: 0

## 2025-07-07 ASSESSMENT — PAIN DESCRIPTION - PAIN TYPE
TYPE: ACUTE PAIN

## 2025-07-07 ASSESSMENT — COLUMBIA-SUICIDE SEVERITY RATING SCALE - C-SSRS
1. WITHIN THE PAST MONTH, HAVE YOU WISHED YOU WERE DEAD OR WISHED YOU COULD GO TO SLEEP AND NOT WAKE UP?: NO
IS THE PATIENT ABLE TO COMPLETE C-SSRS: YES
6. HAVE YOU EVER DONE ANYTHING, STARTED TO DO ANYTHING, OR PREPARED TO DO ANYTHING TO END YOUR LIFE?: NO
2. HAVE YOU ACTUALLY HAD ANY THOUGHTS OF KILLING YOURSELF?: NO

## 2025-07-07 ASSESSMENT — PATIENT HEALTH QUESTIONNAIRE - PHQ9
2. FEELING DOWN, DEPRESSED OR HOPELESS: NOT AT ALL
IS PATIENT ABLE TO COMPLETE PHQ2 OR PHQ9: YES
CLINICAL INTERPRETATION OF PHQ2 SCORE: NO FURTHER SCREENING NEEDED
SUM OF ALL RESPONSES TO PHQ9 QUESTIONS 1 AND 2: 0
1. LITTLE INTEREST OR PLEASURE IN DOING THINGS: NOT AT ALL
SUM OF ALL RESPONSES TO PHQ9 QUESTIONS 1 AND 2: 0

## 2025-07-07 ASSESSMENT — ACTIVITIES OF DAILY LIVING (ADL)
RECENT_DECLINE_ADL: NO
ADL_BEFORE_ADMISSION: INDEPENDENT
ADL_SCORE: 12
ADL_SHORT_OF_BREATH: YES

## 2025-07-08 ENCOUNTER — APPOINTMENT (OUTPATIENT)
Dept: CARDIOLOGY | Age: 85
DRG: 267 | End: 2025-07-08

## 2025-07-08 VITALS
HEIGHT: 69 IN | TEMPERATURE: 97.9 F | HEART RATE: 57 BPM | RESPIRATION RATE: 19 BRPM | DIASTOLIC BLOOD PRESSURE: 72 MMHG | BODY MASS INDEX: 28.8 KG/M2 | SYSTOLIC BLOOD PRESSURE: 119 MMHG | WEIGHT: 194.45 LBS | OXYGEN SATURATION: 95 %

## 2025-07-08 LAB
ANION GAP SERPL CALC-SCNC: 5 MMOL/L (ref 7–19)
ATRIAL RATE (BPM): 56
BUN SERPL-MCNC: 22 MG/DL (ref 6–20)
BUN/CREAT SERPL: 24 (ref 7–25)
CALCIUM SERPL-MCNC: 8.2 MG/DL (ref 8.4–10.2)
CHLORIDE SERPL-SCNC: 108 MMOL/L (ref 97–110)
CO2 SERPL-SCNC: 31 MMOL/L (ref 21–32)
CREAT SERPL-MCNC: 0.9 MG/DL (ref 0.67–1.17)
DEPRECATED RDW RBC: 43.8 FL (ref 39–50)
EGFRCR SERPLBLD CKD-EPI 2021: 85 ML/MIN/{1.73_M2}
ERYTHROCYTE [DISTWIDTH] IN BLOOD: 14.1 % (ref 11–15)
FASTING DURATION TIME PATIENT: ABNORMAL H
GLUCOSE SERPL-MCNC: 97 MG/DL (ref 70–99)
HCT VFR BLD CALC: 37.1 % (ref 39–51)
HGB BLD-MCNC: 11.8 G/DL (ref 13–17)
MAGNESIUM SERPL-MCNC: 2 MG/DL (ref 1.7–2.4)
MCH RBC QN AUTO: 27.1 PG (ref 26–34)
MCHC RBC AUTO-ENTMCNC: 31.8 G/DL (ref 32–36.5)
MCV RBC AUTO: 85.1 FL (ref 78–100)
NRBC BLD MANUAL-RTO: 0 /100 WBC
P AXIS (DEGREES): 56
PLATELET # BLD AUTO: 134 K/MCL (ref 140–450)
POTASSIUM SERPL-SCNC: 4.3 MMOL/L (ref 3.4–5.1)
PR-INTERVAL (MSEC): 286
QRS-INTERVAL (MSEC): 106
QT-INTERVAL (MSEC): 414
QTC: 399
R AXIS (DEGREES): 27
RBC # BLD: 4.36 MIL/MCL (ref 4.5–5.9)
REPORT TEXT: NORMAL
SODIUM SERPL-SCNC: 140 MMOL/L (ref 135–145)
T AXIS (DEGREES): 74
VENTRICULAR RATE EKG/MIN (BPM): 56
WBC # BLD: 13.3 K/MCL (ref 4.2–11)

## 2025-07-08 PROCEDURE — 80048 BASIC METABOLIC PNL TOTAL CA: CPT

## 2025-07-08 PROCEDURE — 10002800 HB RX 250 W HCPCS

## 2025-07-08 PROCEDURE — 36415 COLL VENOUS BLD VENIPUNCTURE: CPT

## 2025-07-08 PROCEDURE — 93010 ELECTROCARDIOGRAM REPORT: CPT | Performed by: INTERNAL MEDICINE

## 2025-07-08 PROCEDURE — 10002801 HB RX 250 W/O HCPCS

## 2025-07-08 PROCEDURE — 83735 ASSAY OF MAGNESIUM: CPT

## 2025-07-08 PROCEDURE — 93005 ELECTROCARDIOGRAM TRACING: CPT

## 2025-07-08 PROCEDURE — 85027 COMPLETE CBC AUTOMATED: CPT

## 2025-07-08 RX ADMIN — WATER 2000 MG: 1 INJECTION INTRAMUSCULAR; INTRAVENOUS; SUBCUTANEOUS at 01:12

## 2025-07-08 ASSESSMENT — PAIN SCALES - GENERAL: PAINLEVEL_OUTOF10: 0

## 2025-07-10 ENCOUNTER — APPOINTMENT (OUTPATIENT)
Dept: GENERAL RADIOLOGY | Age: 85
DRG: 243 | End: 2025-07-10
Attending: EMERGENCY MEDICINE

## 2025-07-10 ENCOUNTER — HOSPITAL ENCOUNTER (INPATIENT)
Age: 85
LOS: 1 days | Discharge: HOME | DRG: 243 | End: 2025-07-11
Attending: EMERGENCY MEDICINE | Admitting: INTERNAL MEDICINE

## 2025-07-10 DIAGNOSIS — I44.2 COMPLETE HEART BLOCK  (CMD): Primary | ICD-10-CM

## 2025-07-10 LAB
ALBUMIN SERPL-MCNC: 3.2 G/DL (ref 3.4–5)
ALBUMIN/GLOB SERPL: 1 {RATIO} (ref 1–2.4)
ALP SERPL-CCNC: 68 UNITS/L (ref 45–117)
ALT SERPL-CCNC: 22 UNITS/L
ANION GAP SERPL CALC-SCNC: 7 MMOL/L (ref 7–19)
AST SERPL-CCNC: 22 UNITS/L
ATRIAL RATE (BPM): 76
BASOPHILS # BLD: 0.1 K/MCL (ref 0–0.3)
BASOPHILS NFR BLD: 1 %
BILIRUB SERPL-MCNC: 0.7 MG/DL (ref 0.2–1)
BUN SERPL-MCNC: 21 MG/DL (ref 6–20)
BUN/CREAT SERPL: 23 (ref 7–25)
CALCIUM SERPL-MCNC: 8.9 MG/DL (ref 8.4–10.2)
CHLORIDE SERPL-SCNC: 105 MMOL/L (ref 97–110)
CO2 SERPL-SCNC: 31 MMOL/L (ref 21–32)
CREAT SERPL-MCNC: 0.92 MG/DL (ref 0.67–1.17)
DEPRECATED RDW RBC: 42.9 FL (ref 39–50)
EGFRCR SERPLBLD CKD-EPI 2021: 83 ML/MIN/{1.73_M2}
EOSINOPHIL # BLD: 0.3 K/MCL (ref 0–0.5)
EOSINOPHIL NFR BLD: 3 %
ERYTHROCYTE [DISTWIDTH] IN BLOOD: 13.8 % (ref 11–15)
FASTING DURATION TIME PATIENT: ABNORMAL H
GLOBULIN SER-MCNC: 3.2 G/DL (ref 2–4)
GLUCOSE BLDC GLUCOMTR-MCNC: 84 MG/DL (ref 70–99)
GLUCOSE SERPL-MCNC: 99 MG/DL (ref 70–99)
HCT VFR BLD CALC: 38.4 % (ref 39–51)
HGB BLD-MCNC: 11.9 G/DL (ref 13–17)
IMM GRANULOCYTES # BLD AUTO: 0 K/MCL (ref 0–0.2)
IMM GRANULOCYTES # BLD: 0 %
LYMPHOCYTES # BLD: 1.5 K/MCL (ref 1–4)
LYMPHOCYTES NFR BLD: 15 %
MCH RBC QN AUTO: 26.5 PG (ref 26–34)
MCHC RBC AUTO-ENTMCNC: 31 G/DL (ref 32–36.5)
MCV RBC AUTO: 85.5 FL (ref 78–100)
MONOCYTES # BLD: 1.2 K/MCL (ref 0.3–0.9)
MONOCYTES NFR BLD: 13 %
NEUTROPHILS # BLD: 6.7 K/MCL (ref 1.8–7.7)
NEUTROPHILS NFR BLD: 68 %
NRBC BLD MANUAL-RTO: 0 /100 WBC
PLATELET # BLD AUTO: 134 K/MCL (ref 140–450)
POTASSIUM SERPL-SCNC: 4.1 MMOL/L (ref 3.4–5.1)
PROT SERPL-MCNC: 6.4 G/DL (ref 6.4–8.2)
QRS-INTERVAL (MSEC): 108
QT-INTERVAL (MSEC): 468
QTC: 386
R AXIS (DEGREES): 43
RAINBOW EXTRA TUBES HOLD SPECIMEN: NORMAL
RBC # BLD: 4.49 MIL/MCL (ref 4.5–5.9)
REPORT TEXT: NORMAL
SODIUM SERPL-SCNC: 139 MMOL/L (ref 135–145)
T AXIS (DEGREES): 73
TROPONIN I SERPL DL<=0.01 NG/ML-MCNC: 48 NG/L
VENTRICULAR RATE EKG/MIN (BPM): 41
WBC # BLD: 9.7 K/MCL (ref 4.2–11)

## 2025-07-10 PROCEDURE — 10002800 HB RX 250 W HCPCS: Performed by: INTERNAL MEDICINE

## 2025-07-10 PROCEDURE — 84484 ASSAY OF TROPONIN QUANT: CPT | Performed by: EMERGENCY MEDICINE

## 2025-07-10 PROCEDURE — C1781 MESH (IMPLANTABLE): HCPCS | Performed by: INTERNAL MEDICINE

## 2025-07-10 PROCEDURE — 10006023 HB SUPPLY 272: Performed by: INTERNAL MEDICINE

## 2025-07-10 PROCEDURE — 10002803 HB RX 637: Performed by: INTERNAL MEDICINE

## 2025-07-10 PROCEDURE — C1785 PMKR, DUAL, RATE-RESP: HCPCS | Performed by: INTERNAL MEDICINE

## 2025-07-10 PROCEDURE — 10002801 HB RX 250 W/O HCPCS: Performed by: INTERNAL MEDICINE

## 2025-07-10 PROCEDURE — 33208 INSRT HEART PM ATRIAL & VENT: CPT | Performed by: INTERNAL MEDICINE

## 2025-07-10 PROCEDURE — 99285 EMERGENCY DEPT VISIT HI MDM: CPT | Performed by: EMERGENCY MEDICINE

## 2025-07-10 PROCEDURE — A4216 STERILE WATER/SALINE, 10 ML: HCPCS | Performed by: INTERNAL MEDICINE

## 2025-07-10 PROCEDURE — 99153 MOD SED SAME PHYS/QHP EA: CPT | Performed by: INTERNAL MEDICINE

## 2025-07-10 PROCEDURE — C1894 INTRO/SHEATH, NON-LASER: HCPCS | Performed by: INTERNAL MEDICINE

## 2025-07-10 PROCEDURE — 10006025 HB SUPPLY 275: Performed by: INTERNAL MEDICINE

## 2025-07-10 PROCEDURE — 93005 ELECTROCARDIOGRAM TRACING: CPT | Performed by: EMERGENCY MEDICINE

## 2025-07-10 PROCEDURE — 99291 CRITICAL CARE FIRST HOUR: CPT

## 2025-07-10 PROCEDURE — 10002805 HB CONTRAST AGENT: Performed by: INTERNAL MEDICINE

## 2025-07-10 PROCEDURE — 93010 ELECTROCARDIOGRAM REPORT: CPT | Performed by: INTERNAL MEDICINE

## 2025-07-10 PROCEDURE — 02HK3JZ INSERTION OF PACEMAKER LEAD INTO RIGHT VENTRICLE, PERCUTANEOUS APPROACH: ICD-10-PCS | Performed by: INTERNAL MEDICINE

## 2025-07-10 PROCEDURE — 10004651 HB RX, NO CHARGE ITEM

## 2025-07-10 PROCEDURE — C1898 LEAD, PMKR, OTHER THAN TRANS: HCPCS | Performed by: INTERNAL MEDICINE

## 2025-07-10 PROCEDURE — 02H63JZ INSERTION OF PACEMAKER LEAD INTO RIGHT ATRIUM, PERCUTANEOUS APPROACH: ICD-10-PCS | Performed by: INTERNAL MEDICINE

## 2025-07-10 PROCEDURE — 85025 COMPLETE CBC W/AUTO DIFF WBC: CPT | Performed by: EMERGENCY MEDICINE

## 2025-07-10 PROCEDURE — 99152 MOD SED SAME PHYS/QHP 5/>YRS: CPT | Performed by: INTERNAL MEDICINE

## 2025-07-10 PROCEDURE — 10004651 HB RX, NO CHARGE ITEM: Performed by: INTERNAL MEDICINE

## 2025-07-10 PROCEDURE — 10003585 HB ROOM CHARGE INTERMEDIATE

## 2025-07-10 PROCEDURE — 80053 COMPREHEN METABOLIC PANEL: CPT | Performed by: EMERGENCY MEDICINE

## 2025-07-10 PROCEDURE — 71045 X-RAY EXAM CHEST 1 VIEW: CPT

## 2025-07-10 PROCEDURE — 0JH606Z INSERTION OF PACEMAKER, DUAL CHAMBER INTO CHEST SUBCUTANEOUS TISSUE AND FASCIA, OPEN APPROACH: ICD-10-PCS | Performed by: INTERNAL MEDICINE

## 2025-07-10 PROCEDURE — A4216 STERILE WATER/SALINE, 10 ML: HCPCS

## 2025-07-10 PROCEDURE — 10006027 HB SUPPLY 278: Performed by: INTERNAL MEDICINE

## 2025-07-10 DEVICE — PACEMAKER
Type: IMPLANTABLE DEVICE | Site: CHEST | Status: FUNCTIONAL
Brand: ACCOLADE™ MRI EL DR

## 2025-07-10 DEVICE — ENVELOPE ABS MED 2.7X2.5IN POLYARYLATE MINOCYCLINE RIFAMPIN: Type: IMPLANTABLE DEVICE | Site: CHEST | Status: FUNCTIONAL

## 2025-07-10 DEVICE — PACE/SENSE LEAD
Type: IMPLANTABLE DEVICE | Site: VENTRICLE | Status: FUNCTIONAL
Brand: INGEVITY™+

## 2025-07-10 DEVICE — PACE/SENSE LEAD
Type: IMPLANTABLE DEVICE | Site: ATRIUM | Status: FUNCTIONAL
Brand: INGEVITY™+

## 2025-07-10 RX ORDER — LIDOCAINE HYDROCHLORIDE 20 MG/ML
INJECTION, SOLUTION EPIDURAL; INFILTRATION; INTRACAUDAL; PERINEURAL PRN
Status: DISCONTINUED | OUTPATIENT
Start: 2025-07-10 | End: 2025-07-10 | Stop reason: HOSPADM

## 2025-07-10 RX ORDER — 0.9 % SODIUM CHLORIDE 0.9 %
10 VIAL (ML) INJECTION PRN
Status: DISCONTINUED | OUTPATIENT
Start: 2025-07-10 | End: 2025-07-10 | Stop reason: HOSPADM

## 2025-07-10 RX ORDER — LOSARTAN POTASSIUM 50 MG/1
100 TABLET ORAL AT BEDTIME
Status: DISCONTINUED | OUTPATIENT
Start: 2025-07-10 | End: 2025-07-11 | Stop reason: HOSPADM

## 2025-07-10 RX ORDER — ATORVASTATIN CALCIUM 80 MG/1
80 TABLET, FILM COATED ORAL AT BEDTIME
Status: DISCONTINUED | OUTPATIENT
Start: 2025-07-10 | End: 2025-07-11 | Stop reason: HOSPADM

## 2025-07-10 RX ORDER — 0.9 % SODIUM CHLORIDE 0.9 %
2 VIAL (ML) INJECTION EVERY 12 HOURS SCHEDULED
Status: DISCONTINUED | OUTPATIENT
Start: 2025-07-10 | End: 2025-07-11 | Stop reason: HOSPADM

## 2025-07-10 RX ORDER — 0.9 % SODIUM CHLORIDE 0.9 %
10 VIAL (ML) INJECTION PRN
Status: DISCONTINUED | OUTPATIENT
Start: 2025-07-10 | End: 2025-07-11 | Stop reason: HOSPADM

## 2025-07-10 RX ORDER — 0.9 % SODIUM CHLORIDE 0.9 %
2 VIAL (ML) INJECTION EVERY 12 HOURS SCHEDULED
Status: DISCONTINUED | OUTPATIENT
Start: 2025-07-10 | End: 2025-07-10 | Stop reason: HOSPADM

## 2025-07-10 RX ORDER — MIDAZOLAM HYDROCHLORIDE 1 MG/ML
INJECTION, SOLUTION INTRAMUSCULAR; INTRAVENOUS PRN
Status: DISCONTINUED | OUTPATIENT
Start: 2025-07-10 | End: 2025-07-10 | Stop reason: HOSPADM

## 2025-07-10 RX ORDER — ONDANSETRON 2 MG/ML
4 INJECTION INTRAMUSCULAR; INTRAVENOUS EVERY 12 HOURS PRN
Status: DISCONTINUED | OUTPATIENT
Start: 2025-07-10 | End: 2025-07-11 | Stop reason: HOSPADM

## 2025-07-10 RX ORDER — ASPIRIN 81 MG/1
81 TABLET, CHEWABLE ORAL AT BEDTIME
Status: DISCONTINUED | OUTPATIENT
Start: 2025-07-10 | End: 2025-07-11 | Stop reason: HOSPADM

## 2025-07-10 RX ORDER — ONDANSETRON 4 MG/1
4 TABLET, ORALLY DISINTEGRATING ORAL EVERY 12 HOURS PRN
Status: DISCONTINUED | OUTPATIENT
Start: 2025-07-10 | End: 2025-07-11 | Stop reason: HOSPADM

## 2025-07-10 RX ORDER — IODIXANOL 320 MG/ML
INJECTION, SOLUTION INTRAVASCULAR PRN
Status: DISCONTINUED | OUTPATIENT
Start: 2025-07-10 | End: 2025-07-10 | Stop reason: HOSPADM

## 2025-07-10 RX ORDER — HYDROCODONE BITARTRATE AND ACETAMINOPHEN 5; 325 MG/1; MG/1
1 TABLET ORAL EVERY 4 HOURS PRN
Status: DISCONTINUED | OUTPATIENT
Start: 2025-07-10 | End: 2025-07-11 | Stop reason: HOSPADM

## 2025-07-10 RX ORDER — HYDRALAZINE HYDROCHLORIDE 20 MG/ML
10 INJECTION INTRAMUSCULAR; INTRAVENOUS EVERY 6 HOURS PRN
Status: DISCONTINUED | OUTPATIENT
Start: 2025-07-10 | End: 2025-07-11 | Stop reason: HOSPADM

## 2025-07-10 RX ADMIN — SODIUM CHLORIDE, PRESERVATIVE FREE 2 ML: 5 INJECTION INTRAVENOUS at 14:20

## 2025-07-10 RX ADMIN — ASPIRIN 81 MG: 81 TABLET, CHEWABLE ORAL at 20:11

## 2025-07-10 RX ADMIN — CEFAZOLIN 2000 MG: 2 INJECTION, POWDER, FOR SOLUTION INTRAMUSCULAR; INTRAVENOUS at 19:00

## 2025-07-10 RX ADMIN — SODIUM CHLORIDE, PRESERVATIVE FREE 2 ML: 5 INJECTION INTRAVENOUS at 20:11

## 2025-07-10 RX ADMIN — ATORVASTATIN CALCIUM 80 MG: 80 TABLET, FILM COATED ORAL at 20:10

## 2025-07-10 RX ADMIN — LOSARTAN POTASSIUM 100 MG: 50 TABLET, FILM COATED ORAL at 20:10

## 2025-07-10 SDOH — SOCIAL STABILITY: SOCIAL INSECURITY: HOW OFTEN DOES ANYONE, INCLUDING FAMILY AND FRIENDS, SCREAM OR CURSE AT YOU?: NEVER

## 2025-07-10 SDOH — ECONOMIC STABILITY: INCOME INSECURITY: IN THE PAST 12 MONTHS, HAS THE ELECTRIC, GAS, OIL, OR WATER COMPANY THREATENED TO SHUT OFF SERVICE IN YOUR HOME?: NO

## 2025-07-10 SDOH — SOCIAL STABILITY: SOCIAL INSECURITY: HOW OFTEN DOES ANYONE, INCLUDING FAMILY AND FRIENDS, THREATEN YOU WITH HARM?: NEVER

## 2025-07-10 SDOH — ECONOMIC STABILITY: FOOD INSECURITY: WITHIN THE PAST 12 MONTHS, THE FOOD YOU BOUGHT JUST DIDN'T LAST AND YOU DIDN'T HAVE MONEY TO GET MORE.: NEVER TRUE

## 2025-07-10 SDOH — SOCIAL STABILITY: SOCIAL INSECURITY: HOW OFTEN DOES ANYONE, INCLUDING FAMILY AND FRIENDS, PHYSICALLY HURT YOU?: NEVER

## 2025-07-10 SDOH — ECONOMIC STABILITY: HOUSING INSECURITY: WHAT IS YOUR LIVING SITUATION TODAY?: I HAVE A STEADY PLACE TO LIVE

## 2025-07-10 SDOH — HEALTH STABILITY: PHYSICAL HEALTH: DO YOU HAVE SERIOUS DIFFICULTY WALKING OR CLIMBING STAIRS?: NO

## 2025-07-10 SDOH — SOCIAL STABILITY: SOCIAL INSECURITY: HOW OFTEN DOES ANYONE, INCLUDING FAMILY AND FRIENDS, INSULT OR TALK DOWN TO YOU?: NEVER

## 2025-07-10 SDOH — HEALTH STABILITY: PHYSICAL HEALTH: DO YOU HAVE DIFFICULTY DRESSING OR BATHING?: NO

## 2025-07-10 SDOH — SOCIAL STABILITY: SOCIAL NETWORK: SUPPORT SYSTEMS: CHILDREN;FRIENDS;CHURCH/FAITH COMMUNITY

## 2025-07-10 SDOH — ECONOMIC STABILITY: GENERAL

## 2025-07-10 SDOH — ECONOMIC STABILITY: HOUSING INSECURITY: WHAT IS YOUR LIVING SITUATION TODAY?: ALONE

## 2025-07-10 SDOH — HEALTH STABILITY: GENERAL: BECAUSE OF A PHYSICAL, MENTAL, OR EMOTIONAL CONDITION, DO YOU HAVE DIFFICULTY DOING ERRANDS ALONE?: NO

## 2025-07-10 SDOH — ECONOMIC STABILITY: HOUSING INSECURITY: DO YOU HAVE PROBLEMS WITH ANY OF THE FOLLOWING?: NONE OF THE ABOVE

## 2025-07-10 SDOH — ECONOMIC STABILITY: HOUSING INSECURITY: WHAT IS YOUR LIVING SITUATION TODAY?: HOUSE

## 2025-07-10 ASSESSMENT — PAIN SCALES - GENERAL
PAINLEVEL_OUTOF10: 0

## 2025-07-10 ASSESSMENT — ORIENTATION MEMORY CONCENTRATION TEST (OMCT)
OMCT SCORE: 3
SAY THE MONTHS IN REVERSE ORDER STARTING WITH LAST MONTH: CORRECT
REPEAT THE NAME AND ADDRESS I ASKED YOU TO REMEMBER: CORRECT
WHAT MONTH IS IT NOW: CORRECT
COUNT BACKWARDS FROM 20 TO 1: CORRECT
OMCT INTERPRETATION: 0-6: NO SIGNIFICANT IMPAIRMENT
WHAT TIME IS IT (NO WATCH OR CLOCK): INCORRECT
WHAT YEAR IS IT NOW (MUST BE EXACT): CORRECT

## 2025-07-10 ASSESSMENT — COLUMBIA-SUICIDE SEVERITY RATING SCALE - C-SSRS
2. HAVE YOU ACTUALLY HAD ANY THOUGHTS OF KILLING YOURSELF?: NO
1. WITHIN THE PAST MONTH, HAVE YOU WISHED YOU WERE DEAD OR WISHED YOU COULD GO TO SLEEP AND NOT WAKE UP?: NO
IS THE PATIENT ABLE TO COMPLETE C-SSRS: YES
6. HAVE YOU EVER DONE ANYTHING, STARTED TO DO ANYTHING, OR PREPARED TO DO ANYTHING TO END YOUR LIFE?: NO

## 2025-07-10 ASSESSMENT — LIFESTYLE VARIABLES
AUDIT-C TOTAL SCORE: 4
HOW OFTEN DO YOU HAVE A DRINK CONTAINING ALCOHOL: 4 OR MORE TIMES PER WEEK
ALCOHOL_USE_STATUS: NO OR LOW RISK WITH VALIDATED TOOL
HOW MANY STANDARD DRINKS CONTAINING ALCOHOL DO YOU HAVE ON A TYPICAL DAY: 0,1 OR 2
HOW OFTEN DO YOU HAVE 6 OR MORE DRINKS ON ONE OCCASION: NEVER

## 2025-07-10 ASSESSMENT — ACTIVITIES OF DAILY LIVING (ADL)
RECENT_DECLINE_ADL: YES, DECLINE IN AMBULATION/TRANSFERRING, COLLABORATE WITH PROVIDER (T)
ADL_SCORE: 12
ADL_BEFORE_ADMISSION: INDEPENDENT
ADL_SHORT_OF_BREATH: YES

## 2025-07-10 ASSESSMENT — PATIENT HEALTH QUESTIONNAIRE - PHQ9
2. FEELING DOWN, DEPRESSED OR HOPELESS: NOT AT ALL
CLINICAL INTERPRETATION OF PHQ2 SCORE: NO FURTHER SCREENING NEEDED
IS PATIENT ABLE TO COMPLETE PHQ2 OR PHQ9: YES
SUM OF ALL RESPONSES TO PHQ9 QUESTIONS 1 AND 2: 0
SUM OF ALL RESPONSES TO PHQ9 QUESTIONS 1 AND 2: 0
1. LITTLE INTEREST OR PLEASURE IN DOING THINGS: NOT AT ALL

## 2025-07-11 ENCOUNTER — CASE MANAGEMENT (OUTPATIENT)
Dept: CARE COORDINATION | Age: 85
End: 2025-07-11

## 2025-07-11 ENCOUNTER — APPOINTMENT (OUTPATIENT)
Dept: GENERAL RADIOLOGY | Age: 85
DRG: 243 | End: 2025-07-11
Attending: INTERNAL MEDICINE

## 2025-07-11 VITALS
WEIGHT: 200.62 LBS | HEIGHT: 70 IN | OXYGEN SATURATION: 97 % | TEMPERATURE: 97.3 F | RESPIRATION RATE: 24 BRPM | SYSTOLIC BLOOD PRESSURE: 151 MMHG | BODY MASS INDEX: 28.72 KG/M2 | HEART RATE: 65 BPM | DIASTOLIC BLOOD PRESSURE: 80 MMHG

## 2025-07-11 LAB
ANION GAP SERPL CALC-SCNC: 7 MMOL/L (ref 7–19)
BUN SERPL-MCNC: 19 MG/DL (ref 6–20)
BUN/CREAT SERPL: 20 (ref 7–25)
CALCIUM SERPL-MCNC: 8.7 MG/DL (ref 8.4–10.2)
CHLORIDE SERPL-SCNC: 107 MMOL/L (ref 97–110)
CO2 SERPL-SCNC: 30 MMOL/L (ref 21–32)
CREAT SERPL-MCNC: 0.95 MG/DL (ref 0.67–1.17)
DEPRECATED RDW RBC: 42.6 FL (ref 39–50)
EGFRCR SERPLBLD CKD-EPI 2021: 80 ML/MIN/{1.73_M2}
ERYTHROCYTE [DISTWIDTH] IN BLOOD: 13.7 % (ref 11–15)
FASTING DURATION TIME PATIENT: NORMAL H
GLUCOSE SERPL-MCNC: 91 MG/DL (ref 70–99)
HCT VFR BLD CALC: 36.3 % (ref 39–51)
HGB BLD-MCNC: 11.2 G/DL (ref 13–17)
MCH RBC QN AUTO: 26.5 PG (ref 26–34)
MCHC RBC AUTO-ENTMCNC: 30.9 G/DL (ref 32–36.5)
MCV RBC AUTO: 86 FL (ref 78–100)
NRBC BLD MANUAL-RTO: 0 /100 WBC
PLATELET # BLD AUTO: 139 K/MCL (ref 140–450)
POTASSIUM SERPL-SCNC: 4 MMOL/L (ref 3.4–5.1)
RBC # BLD: 4.22 MIL/MCL (ref 4.5–5.9)
SODIUM SERPL-SCNC: 140 MMOL/L (ref 135–145)
WBC # BLD: 8.1 K/MCL (ref 4.2–11)

## 2025-07-11 PROCEDURE — 10002803 HB RX 637: Performed by: PHYSICIAN ASSISTANT

## 2025-07-11 PROCEDURE — 80048 BASIC METABOLIC PNL TOTAL CA: CPT | Performed by: INTERNAL MEDICINE

## 2025-07-11 PROCEDURE — 71045 X-RAY EXAM CHEST 1 VIEW: CPT

## 2025-07-11 PROCEDURE — A4216 STERILE WATER/SALINE, 10 ML: HCPCS | Performed by: INTERNAL MEDICINE

## 2025-07-11 PROCEDURE — 10002800 HB RX 250 W HCPCS: Performed by: INTERNAL MEDICINE

## 2025-07-11 PROCEDURE — 10002801 HB RX 250 W/O HCPCS: Performed by: INTERNAL MEDICINE

## 2025-07-11 PROCEDURE — 85027 COMPLETE CBC AUTOMATED: CPT | Performed by: INTERNAL MEDICINE

## 2025-07-11 PROCEDURE — 99223 1ST HOSP IP/OBS HIGH 75: CPT | Performed by: INTERNAL MEDICINE

## 2025-07-11 PROCEDURE — 36415 COLL VENOUS BLD VENIPUNCTURE: CPT | Performed by: INTERNAL MEDICINE

## 2025-07-11 PROCEDURE — 10004651 HB RX, NO CHARGE ITEM: Performed by: INTERNAL MEDICINE

## 2025-07-11 RX ORDER — AMLODIPINE BESYLATE 5 MG/1
5 TABLET ORAL DAILY
Qty: 30 TABLET | Refills: 0 | Status: SHIPPED | OUTPATIENT
Start: 2025-07-11

## 2025-07-11 RX ORDER — AMLODIPINE BESYLATE 5 MG/1
5 TABLET ORAL DAILY
Status: DISCONTINUED | OUTPATIENT
Start: 2025-07-11 | End: 2025-07-11 | Stop reason: HOSPADM

## 2025-07-11 RX ADMIN — SODIUM CHLORIDE, PRESERVATIVE FREE 2 ML: 5 INJECTION INTRAVENOUS at 09:53

## 2025-07-11 RX ADMIN — CEFAZOLIN 2000 MG: 2 INJECTION, POWDER, FOR SOLUTION INTRAMUSCULAR; INTRAVENOUS at 06:06

## 2025-07-11 RX ADMIN — AMLODIPINE BESYLATE 5 MG: 5 TABLET ORAL at 11:36

## 2025-07-11 ASSESSMENT — PAIN SCALES - GENERAL
PAINLEVEL_OUTOF10: 0
PAINLEVEL_OUTOF10: 0

## 2025-08-13 DIAGNOSIS — Z95.2 S/P TAVR (TRANSCATHETER AORTIC VALVE REPLACEMENT): Primary | ICD-10-CM

## 2025-08-28 ENCOUNTER — HOSPITAL ENCOUNTER (OUTPATIENT)
Dept: CARDIAC REHAB | Age: 85
Discharge: STILL ADMITTED OR ADMINISTRATIVE | End: 2025-08-28
Attending: INTERNAL MEDICINE

## 2025-08-28 VITALS — BODY MASS INDEX: 28.79 KG/M2 | HEIGHT: 70 IN

## 2025-08-28 DIAGNOSIS — Z95.2 S/P TAVR (TRANSCATHETER AORTIC VALVE REPLACEMENT): Primary | ICD-10-CM

## 2025-08-28 PROCEDURE — 93798 PHYS/QHP OP CAR RHAB W/ECG: CPT

## 2025-08-28 ASSESSMENT — EJECTION FRACTION
LVEF_VALUE: 65
LVEF_VALUE: 65

## 2025-08-28 ASSESSMENT — LIFESTYLE VARIABLES
RISK_STRATIFICATION: LOWEST RISK - NONE, OR QUIT 6 MOS OR > AT TIME OF EVENT
SMOKING_TYPE: CIGARETTES

## 2025-08-28 ASSESSMENT — 6 MINUTE WALK TEST (6MWT): DID PATIENT PARTICIPATE IN 6 MINUTE WALK TEST: YES

## 2025-08-28 ASSESSMENT — PATIENT HEALTH QUESTIONNAIRE - PHQ9
SUM OF ALL RESPONSES TO PHQ QUESTIONS 1-9: 1
6. FEELING BAD ABOUT YOURSELF - OR THAT YOU ARE A FAILURE OR HAVE LET YOURSELF OR YOUR FAMILY DOWN: NOT AT ALL
3. TROUBLE FALLING OR STAYING ASLEEP OR SLEEPING TOO MUCH: SEVERAL DAYS
7. TROUBLE CONCENTRATING ON THINGS, SUCH AS READING THE NEWSPAPER OR WATCHING TELEVISION: NOT AT ALL
5. POOR APPETITE OR OVEREATING: NOT AT ALL
8. MOVING OR SPEAKING SO SLOWLY THAT OTHER PEOPLE COULD HAVE NOTICED. OR THE OPPOSITE, BEING SO FIGETY OR RESTLESS THAT YOU HAVE BEEN MOVING AROUND A LOT MORE THAN USUAL: NOT AT ALL
2. FEELING DOWN, DEPRESSED OR HOPELESS: NOT AT ALL
4. FEELING TIRED OR HAVING LITTLE ENERGY: NOT AT ALL
1. LITTLE INTEREST OR PLEASURE IN DOING THINGS: NOT AT ALL
9. THOUGHTS THAT YOU WOULD BE BETTER OFF DEAD, OR OF HURTING YOURSELF: NOT AT ALL
10. IF YOU CHECKED OFF ANY PROBLEMS, HOW DIFFICULT HAVE THESE PROBLEMS MADE IT FOR YOU TO DO YOUR WORK, TAKE CARE OF THINGS AT HOME, OR GET ALONG WITH OTHER PEOPLE: NOT DIFFICULT AT ALL

## 2025-09-03 ENCOUNTER — HOSPITAL ENCOUNTER (OUTPATIENT)
Dept: CARDIAC REHAB | Age: 85
Discharge: STILL ADMITTED OR ADMINISTRATIVE | End: 2025-09-03

## 2025-09-03 PROCEDURE — 93798 PHYS/QHP OP CAR RHAB W/ECG: CPT

## 2025-09-04 ENCOUNTER — HOSPITAL ENCOUNTER (OUTPATIENT)
Dept: CARDIAC REHAB | Age: 85
Discharge: STILL ADMITTED OR ADMINISTRATIVE | End: 2025-09-04

## 2025-09-04 PROCEDURE — 93798 PHYS/QHP OP CAR RHAB W/ECG: CPT

## (undated) DEVICE — SUTURE VCRL SZ 3-0 L27IN ABSRB UD L19MM FS-2

## (undated) DEVICE — APPLICATOR BNZN TNCT .6ML STRSTRP SKNCLS NONHYPOALLERGENIC

## (undated) DEVICE — Device

## (undated) DEVICE — HOLDER LIMB THK.25IN 13X3IN 31IN 2 PC 1 STRAP QRLSE BCKL

## (undated) DEVICE — GUIDEWIRE VASC OD.035 IN L150 CM EMERALD 3 MM RDS J CRV FX

## (undated) DEVICE — CABLE PCNG 12FT ALGTR CLIP STRL LF DISP

## (undated) DEVICE — SUTURE VCL+ 4-0 SH 27IN BRAID COAT ABS UNDYED

## (undated) DEVICE — GLOVE SURG 6.5 PROTEXIS LF CRM PF BEAD CUFF STRL PLISPRN

## (undated) DEVICE — KIT ACCS MAS TRANSFORAMINAL LUM IF 2 MAXCESS

## (undated) DEVICE — ADAPTER TBG L1 IN 2 MALE LL LOCK DEHP FREE IV MEDEX .1 ML

## (undated) DEVICE — SYSTEM DELIVERY COMMANDER SAPIEN 3 ULTRA OD26 MM

## (undated) DEVICE — GLV SURG SZ 7.5 THK10MIL WHT ISOLEX SYN

## (undated) DEVICE — DRAPE EQP 72X42IN CARM POLY

## (undated) DEVICE — KIT POSITIONING PROAXIS PRONEV

## (undated) DEVICE — GLOVE SURG 7.5 PROTEXIS LF CRM PF SMTH BEAD CUFF STRL

## (undated) DEVICE — LIGHT HANDLE

## (undated) DEVICE — GLOVE SUR 7.5 PROTEXIS PI PIP CRM PWD F

## (undated) DEVICE — GLOVE SURG 8 PROTEXIS LF CRM PF BEAD CUFF STRL PLISPRN 12IN

## (undated) DEVICE — STOPCOCK IV 1050 PSI 3 WAY HI PRSS RT ROTATE MALE LL ID.082

## (undated) DEVICE — SOLUTION IRRIG 1000ML 0.9% NACL USP BTL

## (undated) DEVICE — SYSTEM IRRISEPT WND IRR 0.05%

## (undated) DEVICE — GOWN SURG XL L3 NONREINFORCE SET IN SLV STRL LF DISP BLUE

## (undated) DEVICE — CABLE TRNDCR TRANSPAC PVC MALE TO FEMALE CNCT HPRS

## (undated) DEVICE — SYRINGE 50 ML GRAD NONPYROGENIC DEHP FREE PVC FREE LOK MED

## (undated) DEVICE — ELECTRODE PT RTN L9 FT VALLEYLAB REM POLYHESIVE ACRYLIC FOAM

## (undated) DEVICE — GLOVE SURG 7 PROTEXIS PI CLASSIC PWDR FREE BEAD CUFF PLISPRN

## (undated) DEVICE — DRAPE UTIL L W18XL24IN PLAS STR ADH REINF

## (undated) DEVICE — NEEDLE NRV STIM DMND TIP INSUL PEDCL ACCS SYS

## (undated) DEVICE — CATHETER 6FR PGTL FL4 FR4 CRV 100110CM 2 WIRE BRAID STIFF

## (undated) DEVICE — HEMOSTAT ABS BIONERT ARISTA MPH PLANT STARCH 1 GM

## (undated) DEVICE — CATHETER OD6 FR L100 CM LG INNER LUM RADOPQ SLCT AL1 CRV COR

## (undated) DEVICE — DRESSING TRNSPAR W6XL8IN FRME STYL HYPOALRG

## (undated) DEVICE — DRESSING TRANS 4.75X4IN ADH HPOAL WTPRF TEGADERM PU STD STRL

## (undated) DEVICE — SEALER BPLR ELECTRD L5.74MM DIA3.48MM CNTR

## (undated) DEVICE — SET INTRO ID14 FR ESHEATH

## (undated) DEVICE — COVER TBL W44XL90IN STD POLYPR REINF DISP

## (undated) DEVICE — SHEATH INTRO 8F 10 CM .035 IN SNAP ON DIL LOCK KINK RST SMTH

## (undated) DEVICE — PACK LAMINECTOMY

## (undated) DEVICE — SLEEVE COMPR M KNEE LEN SGL USE KENDALL SCD

## (undated) DEVICE — GUIDEWIRE STRT 10CM 260CM J CRV TPR .035IN VASC PTFE PERIPH

## (undated) DEVICE — SHEATH INTRO 7FR L10 CM .035 IN SNAP ON DIL LOCK KINK RST SMTH

## (undated) DEVICE — SUTURE VCRL SZ 2-0 L18IN ABSRB VLT L26MM CT-2

## (undated) DEVICE — SYRINGE 20 ML GRAD LOK MED

## (undated) DEVICE — SHIELD RAD RADPAD RAD PRTC STRL FEM ENTRY ANGIO

## (undated) DEVICE — DRAPE C ARM TRNSPAR POLY PROTCT BARR FOR UNIV

## (undated) DEVICE — SUTURE COAT VICRYL 2-0 CT-2 L27 IN BRAID COAT UNDYED ABS

## (undated) DEVICE — GUIDEWIRE VASCULAR SAVVYWIRE 3.2CM XSMALL 2.9CM PRE-SHAPED 1ST GEN INTEGRATED PRESSURE SENSOR FTAVR SOLUTION

## (undated) DEVICE — RELINE MAS BLADE FASCIAL SPLIT

## (undated) DEVICE — SHEATH INTRO 6F 10 CM .035 IN SNAP ON DIL LOCK KINK RST SMTH

## (undated) DEVICE — MARKER SKIN PREP RESIST STRL

## (undated) DEVICE — STRIP 4X.5IN STRSTRP IPHR POLY POR ADH REINFORCE

## (undated) DEVICE — MANIFOLD SCTN INTELLICART BLUE

## (undated) DEVICE — GLOVE SURG 7 PROTEXIS LF BLUE PF SMTH BEAD CUFF INTLK STRL

## (undated) DEVICE — MONITORING NEUROPHYSIOLOGICAL

## (undated) DEVICE — TRAY INSTR PWR NUVASIVE

## (undated) DEVICE — CATHETER 6FR 145D PGTL CRV 110CM 2 WIRE BRAID STIFF PROX

## (undated) DEVICE — KIT HEMSTAT MTRX 8ML PORCINE GEL HUM THROM

## (undated) DEVICE — DRESSING TRNSPAR W4XL4.75IN FRME STYL

## (undated) DEVICE — GLOVE SURG 7 PROTEXIS PI ORTHO PWDR FREE SMTH BEAD CUFF

## (undated) DEVICE — GLOVE SUR 7 PROTEXIS PI PIP CRM PWD F

## (undated) DEVICE — GUIDEWIRE VASC OD.035 IN L150 CM EMERALD X FIRM TIP FX CORE

## (undated) DEVICE — WRAP THERAPEUTIC BACK WO GEL P

## (undated) DEVICE — CUP MED 2 OZ PLASTIC GRAD NA

## (undated) DEVICE — GUIDEWIRE AMP SPST STRGT .035IN 180CM URO 7CM

## (undated) DEVICE — SUTURE VCRL SZ 0 L18IN ABSRB VLT L36MM CT-1

## (undated) DEVICE — SUTURE VICRYL 2-0 FSL

## (undated) DEVICE — SUTURE VICRYL 1 OS-6

## (undated) DEVICE — SYSTEM DEL GRFT MOD MAS

## (undated) DEVICE — SPONGE SURG 4X4IN CTN 16 PLY XRY DTCT STRL LF DISP

## (undated) NOTE — LETTER
OUTSIDE TESTING RESULT REQUEST     IMPORTANT: FOR YOUR IMMEDIATE ATTENTION  Please FAX all test results listed below to: [phone]         * * * * If testing is NOT complete, arrange with patient A.S.A.P. * * * *      Patient Name: Emile Hidalgo  Surgery Date: 2024  Medical Record: [id number]  CSN: [id number]  : 1940 - A: 83 y     Sex: male  Surgeon(s):  ERIC Ordonez MD  Procedure: LUMBAR 1 - LUMBAR 2, LUMBAR 2 - LUMBAR 3, LUMBAR 3 - LUMBAR 4, LUMBAR 4 - LUMBAR 5 REVISION DECOMPRESSION WITH INSTRUMENTED FUSION  Anesthesia Type: General     Surgeon: ERIC Ordonez MD     The following Testing and Time Line are REQUIRED PER ANESTHESIA/SURGEON     CBC / COMPM / PT AND PTT /  U/A REFLUX / MSSA AND MRSA / EKG.   Pt to call your office to arrange testing.      Thank You,   Sent by: Anita Villaseñor